# Patient Record
Sex: FEMALE | Race: WHITE | Employment: UNEMPLOYED | URBAN - METROPOLITAN AREA
[De-identification: names, ages, dates, MRNs, and addresses within clinical notes are randomized per-mention and may not be internally consistent; named-entity substitution may affect disease eponyms.]

---

## 2019-07-29 ENCOUNTER — HOSPITAL ENCOUNTER (EMERGENCY)
Age: 39
Discharge: HOME | End: 2019-07-30
Payer: COMMERCIAL

## 2019-07-29 DIAGNOSIS — K59.00 CONSTIPATION, UNSPECIFIED CONSTIPATION TYPE: ICD-10-CM

## 2019-07-29 DIAGNOSIS — K56.41 FECAL IMPACTION IN RECTUM (HCC): Primary | ICD-10-CM

## 2019-07-29 PROCEDURE — 99283 EMERGENCY DEPT VISIT LOW MDM: CPT

## 2019-07-29 RX ORDER — MONTELUKAST SODIUM 10 MG/1
10 TABLET ORAL
COMMUNITY

## 2019-07-29 RX ORDER — DIAZEPAM 2 MG/1
TABLET ORAL
COMMUNITY
Start: 2019-06-27

## 2019-07-29 ASSESSMENT — ENCOUNTER SYMPTOMS
NAUSEA: 0
CONSTIPATION: 1
ABDOMINAL PAIN: 0
CHEST TIGHTNESS: 0
VOMITING: 0
SHORTNESS OF BREATH: 0
DIARRHEA: 0
RECTAL PAIN: 1

## 2019-07-30 VITALS
WEIGHT: 170 LBS | DIASTOLIC BLOOD PRESSURE: 67 MMHG | HEART RATE: 113 BPM | OXYGEN SATURATION: 98 % | SYSTOLIC BLOOD PRESSURE: 107 MMHG | BODY MASS INDEX: 23.8 KG/M2 | HEIGHT: 71 IN | TEMPERATURE: 97.9 F | RESPIRATION RATE: 15 BRPM

## 2019-07-30 PROCEDURE — 6370000000 HC RX 637 (ALT 250 FOR IP): Performed by: NURSE PRACTITIONER

## 2019-07-30 RX ORDER — ALPRAZOLAM 0.5 MG/1
0.5 TABLET ORAL ONCE
Status: COMPLETED | OUTPATIENT
Start: 2019-07-30 | End: 2019-07-30

## 2019-07-30 RX ADMIN — ALPRAZOLAM 0.5 MG: 0.5 TABLET ORAL at 00:13

## 2019-07-30 RX ADMIN — Medication 240 ML: at 01:30

## 2019-10-17 DIAGNOSIS — M25.551 RIGHT HIP PAIN: Primary | ICD-10-CM

## 2019-11-13 ENCOUNTER — APPOINTMENT (OUTPATIENT)
Dept: PHYSICAL THERAPY | Age: 39
End: 2019-11-13
Payer: COMMERCIAL

## 2019-11-20 ENCOUNTER — HOSPITAL ENCOUNTER (OUTPATIENT)
Dept: PHYSICAL THERAPY | Age: 39
Setting detail: THERAPIES SERIES
Discharge: HOME | End: 2019-11-20
Payer: COMMERCIAL

## 2019-11-20 PROCEDURE — 97110 THERAPEUTIC EXERCISES: CPT

## 2019-11-20 PROCEDURE — 97161 PT EVAL LOW COMPLEX 20 MIN: CPT

## 2019-11-20 PROCEDURE — 97530 THERAPEUTIC ACTIVITIES: CPT

## 2019-11-20 PROCEDURE — 97140 MANUAL THERAPY 1/> REGIONS: CPT

## 2019-11-27 ENCOUNTER — APPOINTMENT (OUTPATIENT)
Dept: PHYSICAL THERAPY | Age: 39
End: 2019-11-27
Payer: COMMERCIAL

## 2019-12-02 ENCOUNTER — HOSPITAL ENCOUNTER (OUTPATIENT)
Dept: PHYSICAL THERAPY | Age: 39
Setting detail: THERAPIES SERIES
Discharge: HOME | End: 2019-12-02
Payer: COMMERCIAL

## 2019-12-02 PROCEDURE — 97140 MANUAL THERAPY 1/> REGIONS: CPT

## 2019-12-02 PROCEDURE — 97530 THERAPEUTIC ACTIVITIES: CPT

## 2019-12-02 PROCEDURE — 97110 THERAPEUTIC EXERCISES: CPT

## 2019-12-09 ENCOUNTER — HOSPITAL ENCOUNTER (OUTPATIENT)
Dept: PHYSICAL THERAPY | Age: 39
Setting detail: THERAPIES SERIES
Discharge: HOME | End: 2019-12-09
Payer: COMMERCIAL

## 2019-12-09 PROCEDURE — 97140 MANUAL THERAPY 1/> REGIONS: CPT

## 2019-12-09 PROCEDURE — 97110 THERAPEUTIC EXERCISES: CPT

## 2019-12-16 ENCOUNTER — HOSPITAL ENCOUNTER (OUTPATIENT)
Dept: PHYSICAL THERAPY | Age: 39
Setting detail: THERAPIES SERIES
Discharge: HOME | End: 2019-12-16
Payer: COMMERCIAL

## 2019-12-16 PROCEDURE — 97140 MANUAL THERAPY 1/> REGIONS: CPT

## 2019-12-16 PROCEDURE — 97110 THERAPEUTIC EXERCISES: CPT

## 2019-12-23 ENCOUNTER — HOSPITAL ENCOUNTER (OUTPATIENT)
Dept: PHYSICAL THERAPY | Age: 39
Setting detail: THERAPIES SERIES
Discharge: HOME | End: 2019-12-23
Payer: COMMERCIAL

## 2019-12-23 PROCEDURE — 97110 THERAPEUTIC EXERCISES: CPT

## 2019-12-23 PROCEDURE — 97140 MANUAL THERAPY 1/> REGIONS: CPT

## 2020-01-08 ENCOUNTER — HOSPITAL ENCOUNTER (OUTPATIENT)
Dept: PHYSICAL THERAPY | Age: 40
Setting detail: THERAPIES SERIES
Discharge: HOME | End: 2020-01-08
Payer: COMMERCIAL

## 2020-01-08 PROCEDURE — 97140 MANUAL THERAPY 1/> REGIONS: CPT

## 2020-01-08 PROCEDURE — 97110 THERAPEUTIC EXERCISES: CPT

## 2020-01-08 NOTE — FLOWSHEET NOTE
[employer]    Physical Therapy Treatment Note/ Progress Report:     Date:  2020    Patient Name:  Lorenza Rodriguez    :  1980  MRN: [id number]  Restrictions/Precautions:    Medical/Treatment Diagnosis Information:  · Diagnosis: Acute right-sided low back pain with sciatica (M54.40)  · Treatment Diagnosis: Low back pain   Insurance/Certification information:  PT Insurance Information: [facility] [employer] - $2000 deductible (met), $6500 OOP max (met), $0 co-pay, 100% co-insurance, 20 PT visits used per calendar year (9 previously used)   Physician Information:  Referring Practitioner: Dr. Machelle Hunter of care signed (Y/N):     Date of Patient follow up with Physician:      Progress Report: []  Yes  [x]  No     Date Range for reporting period:  Beginnin19  Ending:      Progress report due (10 Rx/or 30 days whichever is less): 63    Recertification due (POC duration/ or 90 days whichever is less): 1/15/20     Visit # Insurance Allowable Auth Needed   6 total  1 in 2020 20 []Yes    [x]No     Pain level:  4/10     SUBJECTIVE:  Pt states that she was busy on her feet more with the holidays and then was sick and did not do very much for the last week. Pt states that the lateral hip and posterior thigh have been sore as a result. Pt will be traveling to [place] this weekend for a wedding and then eager to get back into more consistent PT when she returns.       OBJECTIVE: See eval   Observation:    Test measurements:      RESTRICTIONS/PRECAUTIONS: S/p L hip open surgical dislocation, trochanteric osteotomy, microfracture, debridement of femoral head 19; EDS; B hip OA (pt to have L TOSHA in near future)     Exercises/Interventions:     Therapeutic Ex 25' Wt / Resistance sets/sec reps notes   Hip Ext       Bridge 2-1       Kneeling Alt Arm-Leg       Side lying LB rot       Front Plank       Side planks        Kneeling hip abd/ext       1/2 kneeling down chop       Std band pull down       TKE red 2 10    Glute set  5\" 10    Hip add BS  5\" 10    Hip abd iso's in hooklying  5\" 10    S/L clamshells  1 10 L only  Decreased ROM   Pelvic tilts  1 10    Supine marches w/TA act  1 10    Supine BKFO w/TA act No resistance 1 10    SB rolls for knee/hip flexion     SAQ     Seated HS stretch w/foot on stool  30\" 3 manual   Standing incline calf stretch  30\" 3 2nd level          Manual Intervention 30'              Prone PA       GISTM/STM 15'   Distal quad, ITB, lateral hamstrings   L hip gentle PROM 5'      SI Manip       Hip belt mobs 10'      Hip LA distraction              NMR re-education        Mf Activation- re-ed       TrA Re-ed activation       Glute Max re-ed activation                       Therapeutic Exercise and NMR EXR  [x] (62082) Provided verbal/tactile cueing for activities related to strengthening, flexibility, endurance, ROM  for improvements in proximal hip and core control with self care, mobility, lifting and ambulation.  [] (03580) Provided verbal/tactile cueing for activities related to improving balance, coordination, kinesthetic sense, posture, motor skill, proprioception  to assist with core control in self care, mobility, lifting, and ambulation. Therapeutic Activities:  13' Discussed with pt importance of appropriate exercise and WB'ing progression. Discussed with pt importance of core activation with ADL's and of hip strength in order to return to walking.     [x] (99535 or 88629) Provided verbal/tactile cueing for activities related to improving balance, coordination, kinesthetic sense, posture, motor skill, proprioception and motor activation to allow for proper function  with self care and ADLs  [] (89441) Provided training and instruction to the patient for proper core and proximal hip recruitment and positioning with ambulation re-education     Home Exercise Program:    [x] (17558) Reviewed/Progressed HEP activities related to strengthening, flexibility, endurance, ROM of core, proximal hip and LE for functional self-care, mobility, lifting and ambulation   [] (23709) Reviewed/Progressed HEP activities related to improving balance, coordination, kinesthetic sense, posture, motor skill, proprioception of core, proximal hip and LE for self care, mobility, lifting, and ambulation      Manual Treatments:  PROM / STM / Oscillations-Mobs:  G-I, II, III, IV (PA's, Inf., Post.)  [x] (05010) Provided manual therapy to mobilize proximal hip and LS spine soft tissue/joints for the purpose of modulating pain, promoting relaxation,  increasing ROM, reducing/eliminating soft tissue swelling/inflammation/restriction, improving soft tissue extensibility and allowing for proper ROM for normal function with self care, mobility, lifting and ambulation. Modalities:  Declined      Charges:  Timed Code Treatment Minutes: 55   Total Treatment Minutes: 55       [] EVAL (LOW) 36349 (typically 20 minutes face-to-face)  [] EVAL (MOD) 19957 (typically 30 minutes face-to-face)  [] EVAL (HIGH) 94793 (typically 45 minutes face-to-face)  [] RE-EVAL     [x] VQ(78585) x 2    [] IONTO (91497)  [] NMR (67832) x     [] VASO (64970)  [x] Manual (45584) x2     [] Other:  [] TA (16617)x     [] Mech Traction (49887)  [] ES(attended) (03275)     [] ES (un) (13719):     Goals:   Patient stated goal: \"To get off of crutches and to delay total hip replacement\"   []? Progressing: []? Met: []? Not Met: []? Adjusted     Therapist goals for Patient:   Short Term Goals: To be achieved in: 2 weeks  1. Independent in HEP and progression per patient tolerance, in order to prevent re-injury. []? Progressing: []? Met: []? Not Met: []? Adjusted  2. Patient will have a decrease in pain to facilitate improvement in movement, function, and ADLs as indicated by Functional Deficits. []? Progressing: []? Met: []? Not Met: []? Adjusted     Long Term Goals:  To be well [x] Patient limited by fatique  [] Patient limited by pain  [] Patient limited by other medical complications  [] Other:     Overall Progression Towards Functional goals/ Treatment Progress Update:  [] Patient is progressing as expected towards functional goals listed. [] Progression is slowed due to complexities/Impairments listed. [] Progression has been slowed due to co-morbidities. [x] Plan just implemented, too soon to assess goals progression <30days   [] Goals require adjustment due to lack of progress  [] Patient is not progressing as expected and requires additional follow up with physician  [] Other:    Prognosis for POC: [] Good [x] Fair  [] Poor    Patient requires continued skilled intervention: [x] Yes  [] No        PLAN: Progress hip ROM/strength and normalize gait pattern as tolerated by pt. [x] Continue per plan of care [] Alter current plan (see comments)  [] Plan of care initiated [] Hold pending MD visit [] Discharge    Electronically signed by: Ayleen Lima PT    Note: If patient does not return for scheduled/recommended follow up visits, this note will serve as a discharge from care along with the most recent update on progress.

## 2020-01-13 ENCOUNTER — HOSPITAL ENCOUNTER (OUTPATIENT)
Dept: PHYSICAL THERAPY | Age: 40
Setting detail: THERAPIES SERIES
Discharge: HOME | End: 2020-01-13
Payer: COMMERCIAL

## 2020-01-13 PROCEDURE — 97140 MANUAL THERAPY 1/> REGIONS: CPT

## 2020-01-13 PROCEDURE — 97110 THERAPEUTIC EXERCISES: CPT

## 2020-01-13 NOTE — FLOWSHEET NOTE
band for HEP   Glute set     Hip add BS  5\" 10    Hip abd iso's in hooklying  5\" 10    S/L clamshells  1 10 L only  Decreased ROM   Pelvic tilts  1 10    Supine marches w/TA act  1 10    Supine BKFO w/TA act No resistance 1 10    SB rolls for knee/hip flexion     SAQ     Seated HS stretch w/foot on stool  30\" 3 manual   Standing incline calf stretch  30\" 3 2nd level          Manual Intervention 25'              Prone PA       GISTM/STM 10'   Distal quad, ITB, lateral hamstrings   L hip gentle PROM 5'      SI Manip       Hip belt mobs 10'      Hip LA distraction              NMR re-education        Mf Activation- re-ed       TrA Re-ed activation       Glute Max re-ed activation                       Therapeutic Exercise and NMR EXR  [x] (83784) Provided verbal/tactile cueing for activities related to strengthening, flexibility, endurance, ROM  for improvements in proximal hip and core control with self care, mobility, lifting and ambulation.  [] (43334) Provided verbal/tactile cueing for activities related to improving balance, coordination, kinesthetic sense, posture, motor skill, proprioception  to assist with core control in self care, mobility, lifting, and ambulation. Therapeutic Activities:  13' Discussed with pt importance of appropriate exercise and WB'ing progression. Discussed with pt importance of core activation with ADL's and of hip strength in order to return to walking.     [x] (33340 or 74607) Provided verbal/tactile cueing for activities related to improving balance, coordination, kinesthetic sense, posture, motor skill, proprioception and motor activation to allow for proper function  with self care and ADLs  [] (45898) Provided training and instruction to the patient for proper core and proximal hip recruitment and positioning with ambulation re-education     Home Exercise Program:    [x] (99057) Reviewed/Progressed HEP activities related to strengthening, flexibility, endurance, ROM of core, proximal hip and LE for functional self-care, mobility, lifting and ambulation   [] (60473) Reviewed/Progressed HEP activities related to improving balance, coordination, kinesthetic sense, posture, motor skill, proprioception of core, proximal hip and LE for self care, mobility, lifting, and ambulation      Manual Treatments:  PROM / STM / Oscillations-Mobs:  G-I, II, III, IV (PA's, Inf., Post.)  [x] (97249) Provided manual therapy to mobilize proximal hip and LS spine soft tissue/joints for the purpose of modulating pain, promoting relaxation,  increasing ROM, reducing/eliminating soft tissue swelling/inflammation/restriction, improving soft tissue extensibility and allowing for proper ROM for normal function with self care, mobility, lifting and ambulation. Modalities:  Declined      Charges:  Timed Code Treatment Minutes: 55   Total Treatment Minutes: 55       [] EVAL (LOW) 99643 (typically 20 minutes face-to-face)  [] EVAL (MOD) 32373 (typically 30 minutes face-to-face)  [] EVAL (HIGH) 82405 (typically 45 minutes face-to-face)  [] RE-EVAL     [x] BH(73204) x 2    [] IONTO (20258)  [] NMR (42858) x     [] VASO (77396)  [x] Manual (69847) x2     [] Other:  [] TA (51470)x     [] Mech Traction (18074)  [] ES(attended) (39950)     [] ES (un) (10981):     Goals:   Patient stated goal: \"To get off of crutches and to delay total hip replacement\"   []? Progressing: []? Met: []? Not Met: []? Adjusted     Therapist goals for Patient:   Short Term Goals: To be achieved in: 2 weeks  1. Independent in HEP and progression per patient tolerance, in order to prevent re-injury. []? Progressing: []? Met: []? Not Met: []? Adjusted  2. Patient will have a decrease in pain to facilitate improvement in movement, function, and ADLs as indicated by Functional Deficits. []? Progressing: []? Met: []? Not Met: []? Adjusted     Long Term Goals: To be achieved in: 6-8 weeks  1.  Disability index score of 50% or less for the BRAVO to medical complications  [] Other:     Overall Progression Towards Functional goals/ Treatment Progress Update:  [] Patient is progressing as expected towards functional goals listed. [] Progression is slowed due to complexities/Impairments listed. [] Progression has been slowed due to co-morbidities. [x] Plan just implemented, too soon to assess goals progression <30days   [] Goals require adjustment due to lack of progress  [] Patient is not progressing as expected and requires additional follow up with physician  [] Other:    Prognosis for POC: [] Good [x] Fair  [] Poor    Patient requires continued skilled intervention: [x] Yes  [] No        PLAN: Progress hip ROM/strength and normalize gait pattern as tolerated by pt. Pt plans to go to pool this weekend and do some gentle walking in pool for LE strengthening. [x] Continue per plan of care [] Alter current plan (see comments)  [] Plan of care initiated [] Hold pending MD visit [] Discharge    Electronically signed by: Jovana Johnston PT    Note: If patient does not return for scheduled/recommended follow up visits, this note will serve as a discharge from care along with the most recent update on progress.

## 2020-01-22 ENCOUNTER — APPOINTMENT (OUTPATIENT)
Dept: PHYSICAL THERAPY | Age: 40
End: 2020-01-22
Payer: COMMERCIAL

## 2020-01-29 ENCOUNTER — HOSPITAL ENCOUNTER (OUTPATIENT)
Dept: PHYSICAL THERAPY | Age: 40
Setting detail: THERAPIES SERIES
Discharge: HOME | End: 2020-01-29
Payer: COMMERCIAL

## 2020-01-29 PROCEDURE — 97140 MANUAL THERAPY 1/> REGIONS: CPT

## 2020-01-29 PROCEDURE — 97110 THERAPEUTIC EXERCISES: CPT

## 2020-01-29 NOTE — PROGRESS NOTES
[] (40794) Reviewed/Progressed HEP activities related to improving balance, coordination, kinesthetic sense, posture, motor skill, proprioception of core, proximal hip and LE for self care, mobility, lifting, and ambulation      Manual Treatments:  PROM / STM / Oscillations-Mobs:  G-I, II, III, IV (PA's, Inf., Post.)  [x] (35983) Provided manual therapy to mobilize proximal hip and LS spine soft tissue/joints for the purpose of modulating pain, promoting relaxation,  increasing ROM, reducing/eliminating soft tissue swelling/inflammation/restriction, improving soft tissue extensibility and allowing for proper ROM for normal function with self care, mobility, lifting and ambulation. Modalities:  Declined      Charges:  Timed Code Treatment Minutes: 55   Total Treatment Minutes: 55       [] EVAL (LOW) 80426 (typically 20 minutes face-to-face)  [] EVAL (MOD) 73771 (typically 30 minutes face-to-face)  [] EVAL (HIGH) 58706 (typically 45 minutes face-to-face)  [] RE-EVAL     [x] PQ(12251) x 2    [] IONTO (79305)  [] NMR (85222) x     [] VASO (91137)  [x] Manual (51919) x2     [] Other:  [] TA (68419)x     [] Mech Traction (63113)  [] ES(attended) (81329)     [] ES (un) (71704):     Goals:   Patient stated goal: \"To get off of crutches and to delay total hip replacement\"   []? Progressing: []? Met: []? Not Met: []? Adjusted     Therapist goals for Patient:   Short Term Goals: To be achieved in: 2 weeks  1. Independent in HEP and progression per patient tolerance, in order to prevent re-injury. []? Progressing: [x]? Met: []? Not Met: []? Adjusted  2. Patient will have a decrease in pain to facilitate improvement in movement, function, and ADLs as indicated by Functional Deficits. [x]? Progressing: []? Met: []? Not Met: []? Adjusted     Long Term Goals: To be achieved in: 6-8 weeks  1. Disability index score of 50% or less for the BRAVO to assist with reaching prior level of function. [x]? Progressing: []?  Met: []? Not Met: []? Adjusted  2. Patient will demonstrate increased AROM to WNL, good LS mobility, good hip ROM to allow for proper joint functioning as indicated by patients Functional Deficits. [x]? Progressing: []? Met: []? Not Met: []? Adjusted  3. Patient will demonstrate an increase in Strength to good proximal hip and core activation to allow for proper functional mobility as indicated by patients Functional Deficits. [x]? Progressing: []? Met: []? Not Met: []? Adjusted  4. Patient will return to walking for 20 minutes without increased symptoms or restriction. [x]? Progressing: []? Met: []? Not Met: []? Adjusted  5. Patient will be able to drive for 20 minutes without increased symptoms or restriction. []? Progressing: []? Met: [x]? Not Met: []? Adjusted      Progression Towards Functional goals:  [] Patient is progressing as expected towards functional goals listed. [] Progression is slowed due to complexities listed. [] Progression has been slowed due to co-morbidities. [x] Plan just implemented, too soon to assess goals progression  [] Other:     ASSESSMENT:  Pt demonstrates increased TTP and tightness along distal vastus lateralis, distal lateral hamstrings and distal ITB today. Added tandem stance, supine clamshells and lateral stepping at table for improved gluteus medius activation. Pt requires cues for increased glut activation with hip extension during ambulation with 1 crutch, and demonstrated improved gait mechanics after verbal cues and visual cues with mirror. Pt continues to require skilled PT services to address hip/low back flexibility and core/hip strength in order to decrease pain and improve function, in order to care for her children and walk without crutches.        Treatment/Activity Tolerance:  [] Patient tolerated treatment well [x] Patient limited by fatique  [] Patient limited by pain  [] Patient limited by other medical complications  [] Other:     Overall Progression Towards

## 2020-02-03 ENCOUNTER — HOSPITAL ENCOUNTER (OUTPATIENT)
Dept: PHYSICAL THERAPY | Age: 40
Setting detail: THERAPIES SERIES
Discharge: HOME | End: 2020-02-03
Payer: COMMERCIAL

## 2020-02-03 PROCEDURE — 97110 THERAPEUTIC EXERCISES: CPT

## 2020-02-03 PROCEDURE — 97140 MANUAL THERAPY 1/> REGIONS: CPT

## 2020-02-03 NOTE — PROGRESS NOTES
[employer]    Physical Therapy Treatment Note/ Progress Report:     Date:  2/3/2020    Patient Name:  Lorenza Rodriguez    :  1980  MRN: [id number]  Restrictions/Precautions:    Medical/Treatment Diagnosis Information:  · Diagnosis: Acute right-sided low back pain with sciatica (M54.40)  · Treatment Diagnosis: Low back pain   Insurance/Certification information:  PT Insurance Information: [facility] [employer] - $2000 deductible (met), $6500 OOP max (met), $0 co-pay, 100% co-insurance, 20 PT visits used per calendar year (9 previously used)   Physician Information:  Referring Practitioner: Dr. Machelle Hunter of care signed (Y/N):     Date of Patient follow up with Physician:      Progress Report: [x]  Yes  []  No     Date Range for reporting period:  Beginnin20  Ending:      Progress report due (10 Rx/or 30 days whichever is less):     Recertification due (POC duration/ or 90 days whichever is less): 20     Visit # Insurance Allowable Auth Needed   9 total  4 in 2020 []Yes    [x]No     Pain level:  4/10     SUBJECTIVE:  Pt states that she had some lateral hip muscle soreness after last visit. Pt went to pool the following day and walked in the pool, reports feeling great afterwards. Pt states that she was also able to go to an event this past weekend where she only used 1 crutch for ambulation and felt pretty good overall and did not feel like she was limping. Overall, pt states that her back is feeling better since starting PT. Pt follows up with Dr. Annetta Bucio on Wednesday.          OBJECTIVE: See eval   Observation:    Test measurements:   20:   Lumbar ROM: flex = 40, ext = 8, SB = 50% B, rot = 50% B   L MMT: hip flex = 3+, knee ext = 4-, knee flex = 3+, ankle DF = 4-    BRAVO = 58% disability   LEFS = 67% disability     RESTRICTIONS/PRECAUTIONS: S/p L hip open surgical dislocation, trochanteric osteotomy, microfracture, debridement of femoral head 6/26/19; EDS; B hip OA (pt to have L TOSHA in near future)     Exercises/Interventions:     Therapeutic Ex 30' Wt / Resistance sets/sec reps notes   Hip Ext       Bridge 2-1       Kneeling Alt Arm-Leg       Side lying LB rot       Front Plank       Side planks        Lateral stepping next to table  1 2 laps  (3-4 steps per lap) Small steps    Standing glut med activation  1 10 L only    Standing HR  1 10    TKE  2 10 Ball on wall due to pt having difficulty using band for HEP   Supine clamshells orange 2 10    Hip add BS     Hip abd iso's in hooklying  5\" 10    S/L clamshells  1 10 L only  Decreased ROM   Pelvic tilts  HEP   Supine marches w/TA act  1 10    Supine BKFO w/TA act No resistance 1 10    SB rolls for knee/hip flexion     LAQ  210 B   Seated HS stretch w/foot on stool  30\" 3 manual   Standing incline calf stretch  30\" 3 2nd level   Standing marches  1 15 Toe touch with weight shift to L LE          Manual Intervention 30'              Prone PA       GISTM/STM 10'   Distal quad, ITB, lateral hamstrings   L hip gentle PROM 5'      ITB mobs 5'      Hip belt mobs 10'      Hip LA distraction              NMR re-education        Mf Activation- re-ed       TrA Re-ed activation       Glute Max re-ed activation       Tandem stance  20\" 3    Gait training  5'  1 crutch  w/mirror     Therapeutic Exercise and NMR EXR  [x] (44236) Provided verbal/tactile cueing for activities related to strengthening, flexibility, endurance, ROM  for improvements in proximal hip and core control with self care, mobility, lifting and ambulation.  [] (56366) Provided verbal/tactile cueing for activities related to improving balance, coordination, kinesthetic sense, posture, motor skill, proprioception  to assist with core control in self care, mobility, lifting, and ambulation. Therapeutic Activities:  13' Discussed with pt importance of appropriate exercise and WB'ing progression.  Discussed with pt importance of core activation with ADL's and of hip strength in order to return to walking. [x] (01818 or 02552) Provided verbal/tactile cueing for activities related to improving balance, coordination, kinesthetic sense, posture, motor skill, proprioception and motor activation to allow for proper function  with self care and ADLs  [] (53005) Provided training and instruction to the patient for proper core and proximal hip recruitment and positioning with ambulation re-education     Home Exercise Program:    [x] (93304) Reviewed/Progressed HEP activities related to strengthening, flexibility, endurance, ROM of core, proximal hip and LE for functional self-care, mobility, lifting and ambulation   [] (80303) Reviewed/Progressed HEP activities related to improving balance, coordination, kinesthetic sense, posture, motor skill, proprioception of core, proximal hip and LE for self care, mobility, lifting, and ambulation      Manual Treatments:  PROM / STM / Oscillations-Mobs:  G-I, II, III, IV (PA's, Inf., Post.)  [x] (94247) Provided manual therapy to mobilize proximal hip and LS spine soft tissue/joints for the purpose of modulating pain, promoting relaxation,  increasing ROM, reducing/eliminating soft tissue swelling/inflammation/restriction, improving soft tissue extensibility and allowing for proper ROM for normal function with self care, mobility, lifting and ambulation.      Modalities:  Declined      Charges:  Timed Code Treatment Minutes: 60   Total Treatment Minutes: 65       [] EVAL (LOW) 33980 (typically 20 minutes face-to-face)  [] EVAL (MOD) 67644 (typically 30 minutes face-to-face)  [] EVAL (HIGH) 43250 (typically 45 minutes face-to-face)  [] RE-EVAL     [x] BN(34471) x 2    [] IONTO (98015)  [] NMR (54719) x     [] VASO (14067)  [x] Manual (19421) x2     [] Other:  [] TA (65491)x     [] Mech Traction (48986)  [] ES(attended) (16272)     [] ES (un) (68113):     Goals:   Patient stated goal: \"To get off of crutches and to delay total hip replacement\"   []? Progressing: []? Met: []? Not Met: []? Adjusted     Therapist goals for Patient:   Short Term Goals: To be achieved in: 2 weeks  1. Independent in HEP and progression per patient tolerance, in order to prevent re-injury. []? Progressing: [x]? Met: []? Not Met: []? Adjusted  2. Patient will have a decrease in pain to facilitate improvement in movement, function, and ADLs as indicated by Functional Deficits. [x]? Progressing: []? Met: []? Not Met: []? Adjusted     Long Term Goals: To be achieved in: 6-8 weeks  1. Disability index score of 50% or less for the BRAVO to assist with reaching prior level of function. [x]? Progressing: []? Met: []? Not Met: []? Adjusted  2. Patient will demonstrate increased AROM to WNL, good LS mobility, good hip ROM to allow for proper joint functioning as indicated by patients Functional Deficits. [x]? Progressing: []? Met: []? Not Met: []? Adjusted  3. Patient will demonstrate an increase in Strength to good proximal hip and core activation to allow for proper functional mobility as indicated by patients Functional Deficits. [x]? Progressing: []? Met: []? Not Met: []? Adjusted  4. Patient will return to walking for 20 minutes without increased symptoms or restriction. [x]? Progressing: []? Met: []? Not Met: []? Adjusted  5. Patient will be able to drive for 20 minutes without increased symptoms or restriction. []? Progressing: []? Met: [x]? Not Met: []? Adjusted      Progression Towards Functional goals:  [] Patient is progressing as expected towards functional goals listed. [] Progression is slowed due to complexities listed. [] Progression has been slowed due to co-morbidities.   [x] Plan just implemented, too soon to assess goals progression  [] Other:     ASSESSMENT: Pt demonstrates decreased TTP along vastus lateralis, but continues to demonstrate continued TTP along middle to distal ITB and distal lateral hamstrings. Added standing marches for improved gluteus medius activation with weight shift, and added LAQ for increased quad strength. Pt was fatigued with exercise progressions, but no increased hip or LBP noted. Treatment/Activity Tolerance:  [] Patient tolerated treatment well [x] Patient limited by fatique  [] Patient limited by pain  [] Patient limited by other medical complications  [] Other:     Overall Progression Towards Functional goals/ Treatment Progress Update:  [] Patient is progressing as expected towards functional goals listed. [x] Progression is slowed due to complexities/Impairments listed. [] Progression has been slowed due to co-morbidities. [] Plan just implemented, too soon to assess goals progression <30days   [] Goals require adjustment due to lack of progress  [] Patient is not progressing as expected and requires additional follow up with physician  [] Other:    Prognosis for POC: [] Good [x] Fair  [] Poor    Patient requires continued skilled intervention: [x] Yes  [] No        PLAN: Progress hip ROM/strength and normalize gait pattern as tolerated by pt. Pt follows up with Dr. Corey Sep 2/5/20 to discuss his opinion on scheduling L hip sx vs. Holding off on sx. [x] Continue per plan of care [] Alter current plan (see comments)  [] Plan of care initiated [] Hold pending MD visit [] Discharge    Electronically signed by: Rambo Moncada PT    Note: If patient does not return for scheduled/recommended follow up visits, this note will serve as a discharge from care along with the most recent update on progress.

## 2020-02-10 ENCOUNTER — HOSPITAL ENCOUNTER (OUTPATIENT)
Dept: PHYSICAL THERAPY | Age: 40
Setting detail: THERAPIES SERIES
Discharge: HOME | End: 2020-02-10
Payer: COMMERCIAL

## 2020-02-10 PROCEDURE — 97530 THERAPEUTIC ACTIVITIES: CPT

## 2020-02-10 PROCEDURE — 97110 THERAPEUTIC EXERCISES: CPT

## 2020-02-10 PROCEDURE — 97140 MANUAL THERAPY 1/> REGIONS: CPT

## 2020-02-10 NOTE — PROGRESS NOTES
[facility]    Physical Therapy Treatment Note/ Progress Report:     Date:  2/10/2020    Patient Name:  Kartik Montiel    :  1980  MRN: [id number]  Restrictions/Precautions:    Medical/Treatment Diagnosis Information:  · Diagnosis: Acute right-sided low back pain with sciatica (M54.40)  · Treatment Diagnosis: Low back pain   Insurance/Certification information:  PT Insurance Information: [facility] [employer] - $2000 deductible (met), $6500 OOP max (met), $0 co-pay, 100% co-insurance, 20 PT visits used per calendar year (9 previously used)   Physician Information:  Referring Practitioner: Dr. Cuca Wilson of care signed (Y/N):     Date of Patient follow up with Physician:      Progress Report: [x]  Yes  []  No     Date Range for reporting period:  Beginnin20  Ending:      Progress report due (10 Rx/or 30 days whichever is less): 3/14/48    Recertification due (POC duration/ or 90 days whichever is less): 20     Visit # Insurance Allowable Auth Needed   10 total  5 in 2020 []Yes    [x]No     Pain level:  4/10     SUBJECTIVE:  Pt reports that her hip has been feeling stronger overall, and feels like she is able to do more. Pt states that she went to pool again this past weekend and hip felt really good with walking around. Pt reports that she saw Dr. Veronica Carrillo, who discussed with pt about having TOSHA at the end of the summer. Pt states that overall, her low back is feeling much better.          OBJECTIVE: See eval   Observation:    Test measurements:   20:   Lumbar ROM: flex = 40, ext = 8, SB = 50% B, rot = 50% B   L MMT: hip flex = 3+, knee ext = 4-, knee flex = 3+, ankle DF = 4-    BRAVO = 58% disability   LEFS = 67% disability     RESTRICTIONS/PRECAUTIONS: S/p L hip open surgical dislocation, trochanteric osteotomy, microfracture, debridement of femoral head 19; EDS; B hip OA (pt to have L TOSHA in near future) Exercises/Interventions:     Therapeutic Ex 30' Wt / Resistance sets/sec reps notes   Hip Ext       Bridge 2-1       Kneeling Alt Arm-Leg       Side lying LB rot       Front Plank       Side planks        Lateral stepping next to table  1 2 laps  (3-4 steps per lap) Small steps    Standing glut med activation  1 10 L only    Standing HR  1 10    TKE  2 10 Ball on wall due to pt having difficulty using band for HEP   Supine clamshells orange 2 10    Bridges  2 10    S/L clamshells  1 10 L only  Decreased ROM   Pelvic tilts  HEP   Supine marches w/TA act  1 10    Supine BKFO w/TA act No resistance 1 10    SB rolls for knee/hip flexion     LAQ  210 B   Seated HS stretch w/foot on stool  30\" 3 manual   Standing incline calf stretch  30\" 3 2nd level   Standing gentle hip flexor stretch  30\" 3    Standing marches  1 15 Toe touch with weight shift to L LE          Manual Intervention 30'              Prone PA       GISTM/STM 10'   Distal quad, ITB, lateral hamstrings   L hip gentle PROM 5'      ITB mobs 5'      Hip belt mobs 10'      Hip LA distraction              NMR re-education        Mf Activation- re-ed       TrA Re-ed activation       Glute Max re-ed activation       Tandem stance  20\" 3    Gait training  1 crutch  w/mirror     Therapeutic Exercise and NMR EXR  [x] (33716) Provided verbal/tactile cueing for activities related to strengthening, flexibility, endurance, ROM  for improvements in proximal hip and core control with self care, mobility, lifting and ambulation.  [] (66621) Provided verbal/tactile cueing for activities related to improving balance, coordination, kinesthetic sense, posture, motor skill, proprioception  to assist with core control in self care, mobility, lifting, and ambulation. Therapeutic Activities:  13' Discussed with pt importance of appropriate exercise and WB'ing progression.  Discussed with pt importance of core activation with ADL's and of hip strength in order to return to walking. [x] (14118 or 12400) Provided verbal/tactile cueing for activities related to improving balance, coordination, kinesthetic sense, posture, motor skill, proprioception and motor activation to allow for proper function  with self care and ADLs  [] (91591) Provided training and instruction to the patient for proper core and proximal hip recruitment and positioning with ambulation re-education     Home Exercise Program:    [x] (76136) Reviewed/Progressed HEP activities related to strengthening, flexibility, endurance, ROM of core, proximal hip and LE for functional self-care, mobility, lifting and ambulation   [] (09547) Reviewed/Progressed HEP activities related to improving balance, coordination, kinesthetic sense, posture, motor skill, proprioception of core, proximal hip and LE for self care, mobility, lifting, and ambulation      Manual Treatments:  PROM / STM / Oscillations-Mobs:  G-I, II, III, IV (PA's, Inf., Post.)  [x] (62367) Provided manual therapy to mobilize proximal hip and LS spine soft tissue/joints for the purpose of modulating pain, promoting relaxation,  increasing ROM, reducing/eliminating soft tissue swelling/inflammation/restriction, improving soft tissue extensibility and allowing for proper ROM for normal function with self care, mobility, lifting and ambulation. Modalities:  Declined      Charges:  Timed Code Treatment Minutes: 55   Total Treatment Minutes: 60       [] EVAL (LOW) 20811 (typically 20 minutes face-to-face)  [] EVAL (MOD) 20781 (typically 30 minutes face-to-face)  [] EVAL (HIGH) 28834 (typically 45 minutes face-to-face)  [] RE-EVAL     [x] SD(62416) x 1    [] IONTO (73200)  [] NMR (82286) x     [] VASO (73878)  [x] Manual (70812) x2     [] Other:  [x] TA (18609)x1     [] Mech Traction (08986)  [] ES(attended) (29326)     [] ES (un) (48931):     Goals:   Patient stated goal: \"To get off of crutches and to delay total hip replacement\"   []? Progressing: []?  Met: []?

## 2020-02-19 ENCOUNTER — APPOINTMENT (OUTPATIENT)
Dept: PHYSICAL THERAPY | Age: 40
End: 2020-02-19
Payer: COMMERCIAL

## 2020-02-24 ENCOUNTER — APPOINTMENT (OUTPATIENT)
Dept: PHYSICAL THERAPY | Age: 40
End: 2020-02-24
Payer: COMMERCIAL

## 2020-07-13 ENCOUNTER — HOSPITAL ENCOUNTER (OUTPATIENT)
Age: 40
Discharge: HOME | End: 2020-07-13
Payer: COMMERCIAL

## 2020-07-13 ENCOUNTER — OFFICE VISIT (OUTPATIENT)
Dept: ORTHOPEDIC SURGERY | Age: 40
End: 2020-07-13
Payer: COMMERCIAL

## 2020-07-13 VITALS — HEIGHT: 71 IN | WEIGHT: 160 LBS | BODY MASS INDEX: 22.4 KG/M2

## 2020-07-13 PROCEDURE — 36415 COLL VENOUS BLD VENIPUNCTURE: CPT

## 2020-07-13 PROCEDURE — 85652 RBC SED RATE AUTOMATED: CPT

## 2020-07-13 PROCEDURE — 99243 OFF/OP CNSLTJ NEW/EST LOW 30: CPT | Performed by: ORTHOPAEDIC SURGERY

## 2020-07-13 PROCEDURE — 85025 COMPLETE CBC W/AUTO DIFF WBC: CPT

## 2020-07-13 PROCEDURE — 86140 C-REACTIVE PROTEIN: CPT

## 2020-07-13 NOTE — PROGRESS NOTES
Chief Complaint    Hip Pain (lt hip ongoing pain , had sx last year that didn't take, looking for a specific type of THR)      History of Present Illness:  Sage Caban is a 47 y.o. female who was referred by her primary care physician Dr. Sienna Godinez. Patient presents today for new patient evaluation of left hip pain. Patient has a history of planned femoral osteotomy in June 2019 performed by Dr. Rajesh Godoy. Patient states that the femoral osteotomy surgery was halted intraoperatively because Dr. Rajesh Godoy felt that there was too much osteoarthritis noted within the left hip and the femoral osteotomy would not give her the relief that she needs. Patient had an intra-articular hip injection prior to the osteotomy which gave her almost complete relief. She states that since the failed femoral osteotomy in 2019 she has had continued pain and during her rehab in September 2019 she tore her gluteus which created bleeding and hematoma. This also created weakness and imbalance. She states that she continues to have significant lateral hip pain with only mild groin pain. She does experience a catching sensation with external rotation within the left hip. Is presently ambulating without an assistive device with a limp favoring the left hip. She has been seen by numerous orthopedic surgeons and total hip arthroplasty was recommended. Patient has a history of Sb-Danlos syndrome, IBS, headaches, and anxiety. She is been in pain management postoperatively secondary to severe pain post femoral osteotomy. Patient does have a questionable allergy to nickel and has been formally tested. She also is latex allergic. She has seen multiple orthopedic surgeons including Dr. Eve Honeycutt, Dr. Alvina Marti in [place], Dr. Carlos Canela, Dr. Marquez Pearson, as well as at least 3 other surgeons. She basically is coming in today to schedule a total hip replacement.       Medical History:  Patient's medications, allergies, past medical, surgical, social and family histories were reviewed and updated as appropriate. Review of Systems:  Relevant review of systems reviewed and available in the patient's chart    Vital Signs: There were no vitals filed for this visit. General Exam:   Constitutional: Patient is adequately groomed with no evidence of malnutrition  DTRs: Deep tendon reflexes are intact  Mental Status: The patient is oriented to time, place and person. The patient's mood and affect are appropriate. Lymphatic: The lymphatic examination bilaterally reveals all areas to be without enlargement or induration. Vascular: Examination reveals no swelling or calf tenderness. Peripheral pulses are palpable and 2+. Neurological: The patient has good coordination. There is no weakness or sensory deficit. Body mass index is 22.32 kg/m². Left hip Examination:    Inspection:  No erythema or signs of infection. There are no cutaneous lesions. There is a well-healed lateral incision noted over the left hip  Palpation: Left hip tenderness to palpation over the greater trochanteric region and along her incision. There is only mild groin pain. Range of Motion:  Painful limited range of motion of the hip particularly with flexion and external rotation causing reproducible groin pain. Strength: -5/5 strength in flexion and abduction limited by pain. Special Tests: There is a positive log roll maneuver. Positive straight leg raise against resistance. Positive Rudy's test.  Negative Homans test.    Skin: There are no rashes, ulcerations or lesions. Gait: Antalgic favoring the left side           Additional Examinations:         Contralateral Exam: Examination of the right hip reveals intact skin. The patient demonstrates full painless range of motion with regards to flexion, abduction, internal and external rotation. There is no tenderness about the greater trochanter.  There is a negative straight leg raise against resistance. Strength is 5/5 throughout all planes. Lower Back: Examination of the lower back does not show any tenderness, deformity or injury. Range of motion is unremarkable. There is no gross instability. There are no rashes, ulcerations or lesions. Strength and tone are normal.    Radiology:     X-rays obtained and reviewed in office:  Views 2 views including AP pelvis and lateral  Location left hip  Impression no fractures or malalignment identified. There is mild degenerative osteoarthritis of the femoral acetabular joint space narrowing. Impression:  Encounter Diagnoses   Name Primary?  Pain of left hip joint Yes    Primary osteoarthritis of right hip        Office Procedures:  Orders Placed This Encounter   Procedures    XR HIP LEFT (2-3 VIEWS)     Standing Status:   Future     Number of Occurrences:   1     Standing Expiration Date:   7/10/2021    MRI HIP LEFT WO CONTRAST     Standing Status:   Future     Standing Expiration Date:   7/13/2021     Scheduling Instructions:      [facility] [phone]      MRI LT HIP W/O CONTRAST WITH CARTILAGE MAPPING AND MARS PROTOCOL      PAIN            SCHED: ONCE AUTHORIZED    CBC WITH AUTO DIFFERENTIAL     Standing Status:   Future     Number of Occurrences:   1     Standing Expiration Date:   7/13/2021    SEDIMENTATION RATE     Standing Status:   Future     Number of Occurrences:   1     Standing Expiration Date:   7/13/2021    C-REACTIVE PROTEIN     Standing Status:   Future     Number of Occurrences:   1     Standing Expiration Date:   7/13/2021       Treatment Plan:  I discussed with the patient the nature of osteoarthritis of the hip. We talked about treatment of arthritis and the various options that are involved with this. The patient understands that the treatments can vary from essentially doing nothing to a total joint replacement arthroplasty for arthritis.  I then went on to describe the utilization of glucosamine and chondroitin sulfate as a joint nutrition product. We talked about the fact that this is essentially a joint vitamin with typically minimal side effects. We also talked about utilization of prescription over-the-counter anti-inflammatory medications as the next option. We also talked about the corticosteroid injections and the fact that this can give a brief window of relief, but does not cure the problem; in fact, the pain often has a rebound effect in 6-10 weeks after the steroid has worn off. Lastly we discussed total joint replacement arthroplasty as the final and definitive step in treatment of arthritis. Patient realizes the magnitude of this type of treatment as well as having voiced a general understanding to the duration of the prosthesis. The patient voiced understanding to these continuum of treatment options. Because her x-rays did not appear to be significantly arthritic, I would recommend doing a MRI also to look for abductor tears, AVN etc. we would also like to make sure she does not have an infection. We are going to order a cartilage mapping MRI of the left hip along with CBC, CRP and ESR and she will follow-up after the MRI and the blood work is obtained for further discussion. If she was to have surgery, a lateral approach total hip replacement with robotic assistance would be preferable. She most probably does have some abductor tears which could be fixed at the same time. We also talked about perhaps a dual mobility type of configuration. Unfortunately, if she does have allergies to nickel, she may have to have a standard total hip with ceramic head. The liner of the dual mobility may have cobalt chrome, nickel. We will see her back after the MRI as well as the labs.

## 2020-07-14 LAB
BASOPHILS ABSOLUTE: 0.1 K/UL (ref 0–0.2)
BASOPHILS RELATIVE PERCENT: 1.2 %
C-REACTIVE PROTEIN: 0.4 MG/L (ref 0–5.1)
EOSINOPHILS ABSOLUTE: 0.3 K/UL (ref 0–0.6)
EOSINOPHILS RELATIVE PERCENT: 3.3 %
HCT VFR BLD CALC: 38.9 % (ref 36–48)
HEMOGLOBIN: 13.8 G/DL (ref 12–16)
LYMPHOCYTES ABSOLUTE: 2.6 K/UL (ref 1–5.1)
LYMPHOCYTES RELATIVE PERCENT: 33.4 %
MCH RBC QN AUTO: 33 PG (ref 26–34)
MCHC RBC AUTO-ENTMCNC: 35.4 G/DL (ref 31–36)
MCV RBC AUTO: 93.1 FL (ref 80–100)
MONOCYTES ABSOLUTE: 0.5 K/UL (ref 0–1.3)
MONOCYTES RELATIVE PERCENT: 7 %
NEUTROPHILS ABSOLUTE: 4.3 K/UL (ref 1.7–7.7)
NEUTROPHILS RELATIVE PERCENT: 55.1 %
PDW BLD-RTO: 12.2 % (ref 12.4–15.4)
PLATELET # BLD: 277 K/UL (ref 135–450)
PMV BLD AUTO: 8 FL (ref 5–10.5)
RBC # BLD: 4.18 M/UL (ref 4–5.2)
SEDIMENTATION RATE, ERYTHROCYTE: 9 MM/HR (ref 0–20)
WBC # BLD: 7.8 K/UL (ref 4–11)

## 2020-07-15 ENCOUNTER — TELEPHONE (OUTPATIENT)
Dept: ORTHOPEDIC SURGERY | Age: 40
End: 2020-07-15

## 2020-08-13 ENCOUNTER — OFFICE VISIT (OUTPATIENT)
Dept: ORTHOPEDIC SURGERY | Age: 40
End: 2020-08-13
Payer: COMMERCIAL

## 2020-08-13 PROCEDURE — 99214 OFFICE O/P EST MOD 30 MIN: CPT | Performed by: ORTHOPAEDIC SURGERY

## 2020-08-13 RX ORDER — GABAPENTIN 100 MG/1
CAPSULE ORAL
Qty: 30 CAPSULE | Refills: 0 | Status: SHIPPED | OUTPATIENT
Start: 2020-08-13 | End: 2020-08-23

## 2020-08-13 NOTE — PROGRESS NOTES
allergies, past medical, surgical, social and family histories were reviewed and updated as appropriate. Review of Systems:  Relevant review of systems reviewed and available in the patient's chart    Vital Signs: There were no vitals filed for this visit. General Exam:   Constitutional: Patient is adequately groomed with no evidence of malnutrition  DTRs: Deep tendon reflexes are intact  Mental Status: The patient is oriented to time, place and person. The patient's mood and affect are appropriate. Lymphatic: The lymphatic examination bilaterally reveals all areas to be without enlargement or induration. Vascular: Examination reveals no swelling or calf tenderness. Peripheral pulses are palpable and 2+. Neurological: The patient has good coordination. There is no weakness or sensory deficit. There is no height or weight on file to calculate BMI. Left hip Examination:    Inspection:  No erythema or signs of infection. There are no cutaneous lesions. There is a well-healed lateral incision noted over the left hip    Palpation: Left hip tenderness to palpation over the greater trochanteric region and along her incision. There is only mild groin pain. Patient does have hypersensitivity to touch over the lateral greater trochanteric region. Range of Motion:  Painful limited range of motion of the hip particularly with flexion and external rotation causing reproducible groin pain. Strength: -5/5 strength in flexion and abduction limited by pain. Special Tests: There is a positive log roll maneuver. Positive straight leg raise against resistance. Positive Rudy's test.  Negative Homans test.    Skin: There are no rashes, ulcerations or lesions. Gait: Antalgic favoring the left side           Additional Examinations:         Contralateral Exam: Examination of the right hip reveals intact skin.  The patient demonstrates full painless range of motion with regards to flexion, abduction, internal and external rotation. There is no tenderness about the greater trochanter. There is a negative straight leg raise against resistance. Strength is 5/5 throughout all planes. Lower Back: Examination of the lower back does not show any tenderness, deformity or injury. Range of motion is unremarkable. There is no gross instability. There are no rashes, ulcerations or lesions. Strength and tone are normal.    Radiology:       X-rays were ordered today and reviewed. 2 views. AP pelvis and lateral left hip. They demonstrate no evidence of fractures or dislocations. 2 screws present consistent with previous ORIF. Site: [facility] #: [id number] #: [id number] Procedure: MR Left Hip w/o Contrast  ATTN: cartilage mapping; Reason for Exam: pain of left hip, primary osteoarthritis of right hip    This document is confidential medical information.  Unauthorized disclosure or use of this information is prohibited by law. If you are not the intended recipient of this document, please advise us by calling immediately [phone].         [facility], [address] [place] [address]              Patient Name: Suraj Melton    Case ID: [id number]    Patient : 1980    Referring Physician: Araceli Justin MD    Exam Date: 2020    Exam Description: MR Left Hip w/o Contrast  ATTN: cartilage mapping              HISTORY:  Left hip pain.         TECHNICAL FACTORS:  Long- and short-axis fat- and water-weighted images were performed.         COMPARISON:  None.         FINDINGS:  Coxa valgus.  No acute fracture or contusion. No AVN of the femoral heads.         Surgical hardware in the left greater trochanter. Decreased left femoroacetabular joint space    with chondral thinning.  No osseous erosion.  Anteversion of the femoral  Mild spurring of the    acetabulum.  No acute labral tear.  No paralabral cysts.  No joint effusion.         No acute muscle or tendon strain.  No trochanteric bursitis.         No lymphadenopathy in the visualized pelvis.         CONCLUSION:    Mild left hip arthrosis with chondral thinning and subtle spurring.  No osseous erosion.         Thank you for the opportunity to provide your interpretation. Impression:  Encounter Diagnoses   Name Primary?  Neuritis Yes    Pain of left hip joint     Greater trochanteric bursitis of left hip     Snapping hip syndrome, left        Office Procedures:  Orders Placed This Encounter   Procedures    [facility]     Referral Priority:   Routine     Referral Type:   Eval and Treat     Referral Reason:   Specialty Services Required     Requested Specialty:   Physical Therapy     Number of Visits Requested:   1       Treatment Plan: Patient has mild osteoarthritis involving her hip. I believe she is more suffering from IT band snapping hip syndrome and neuritis. Have recommended physical therapy. She will also be placed on Neurontin 100 mg qhs to start. We will then increase her to 200 mg and 300 mg slowly. In addition she will try Lidoderm patches. Patient will follow-up in 4 weeks for a virtual visit.

## 2020-08-24 ENCOUNTER — HOSPITAL ENCOUNTER (OUTPATIENT)
Dept: PHYSICAL THERAPY | Age: 40
Setting detail: THERAPIES SERIES
Discharge: HOME | End: 2020-08-24
Payer: COMMERCIAL

## 2020-08-24 PROCEDURE — 97162 PT EVAL MOD COMPLEX 30 MIN: CPT

## 2020-08-24 PROCEDURE — 97140 MANUAL THERAPY 1/> REGIONS: CPT

## 2020-08-24 NOTE — FLOWSHEET NOTE
[facility] [employer]    Physical Therapy Treatment Note/ Progress Report:     Date:  2020    Patient Name:  Marielle Victor    :  1980  MRN: [id number]  Medical/Treatment Diagnosis Information:  · Diagnosis: Left hip pain (M25.551), greater trochanteric bursitis of left hip (M70.61), left snapping hip syndrome (M24.851)  · Treatment Diagnosis: Left hip pain  Insurance/Certification information:  PT Insurance Information: [facility] - $6500 deductible, $6500 OOP max, $10 co-pay, 100% co-insurance, 20 PT visits per calendar year (2 previously used)  Physician Information:  Referring Practitioner: Dr. Brianna Porter of care signed (Y/N):     Date of Patient follow up with Physician: 9/10/20     Progress Report: []  Yes  [x]  No     Functional Scale: LEFS = 69%   Date: 20    Date Range for reporting period:  Beginnin20  Ending:      Progress report due (10 Rx/or 30 days whichever is less):      Recertification due (POC duration/ or 90 days whichever is less): 10/19/20     Visit # Insurance Allowable Auth Needed   1 18 - auth required after 10 visits [x]Yes    []No     Pain level:  4-7/10     SUBJECTIVE:  See eval    OBJECTIVE: See eval   Observation:    Test measurements:      RESTRICTIONS/PRECAUTIONS: Hx of L hip sx 19 - s/p open surgical hip dislocation with trochanteric osteotomy, debridement of femoral head and microfracture; EDS; B hip dysplasia (L>R), OA    Exercises/Interventions:     Therapeutic Ex 5' Resistance Sets/sec Reps Notes          Modified figure 4 piriformis stretch  20\" 3x    Hooklying add BS  5\" 10    Supine clamshells yellow 1 10                                                                                        Manual Intervention 10'       Knee mobs/PROM       Tib/Fem Mobs       Patella Mobs       Ankle mobs       STM/gentle stick 10'   Proximal to mid ITB, gluteus medius, gluteus uriel NMR re-education                                                                          Therapeutic Exercise and NMR EXR  [x] (60639) Provided verbal/tactile cueing for activities related to strengthening, flexibility, endurance, ROM for improvements in LE, proximal hip, and core control with self care, mobility, lifting, ambulation.  [] (54408) Provided verbal/tactile cueing for activities related to improving balance, coordination, kinesthetic sense, posture, motor skill, proprioception  to assist with LE, proximal hip, and core control in self care, mobility, lifting, ambulation and eccentric single leg control.      NMR and Therapeutic Activities:    [] (87568 or 65396) Provided verbal/tactile cueing for activities related to improving balance, coordination, kinesthetic sense, posture, motor skill, proprioception and motor activation to allow for proper function of core, proximal hip and LE with self care and ADLs  [] (50950) Gait Re-education- Provided training and instruction to the patient for proper LE, core and proximal hip recruitment and positioning and eccentric body weight control with ambulation re-education including up and down stairs     Home Exercise Program:    [x] (34049) Reviewed/Progressed HEP activities related to strengthening, flexibility, endurance, ROM of core, proximal hip and LE for functional self-care, mobility, lifting and ambulation/stair navigation   [] (63214)Reviewed/Progressed HEP activities related to improving balance, coordination, kinesthetic sense, posture, motor skill, proprioception of core, proximal hip and LE for self care, mobility, lifting, and ambulation/stair navigation      Manual Treatments:  PROM / STM / Oscillations-Mobs:  G-I, II, III, IV (PA's, Inf., Post.)  [x] (34651) Provided manual therapy to mobilize LE, proximal hip and/or LS spine soft tissue/joints for the purpose of modulating pain, promoting relaxation,  increasing ROM, reducing/eliminating soft increased symptoms or restriction. []? Progressing: []? Met: []? Not Met: []? Adjusted  5. Patient will be able to walk for 15 minutes without increased symptoms or restriction. []? Progressing: []? Met: []? Not Met: []? Adjusted     Progression Towards Functional goals:  [] Patient is progressing as expected towards functional goals listed. [] Progression is slowed due to complexities listed. [] Progression has been slowed due to co-morbidities. [x] Plan just implemented, too soon to assess goals progression  [] Other:     ASSESSMENT:  See eval    Return to Play: (if applicable)   []  Stage 1: Intro to Strength   []  Stage 2: Return to Run and Strength   []  Stage 3: Return to Jump and Strength   []  Stage 4: Dynamic Strength and Agility   []  Stage 5: Sport Specific Training     []  Ready to Return to Play, Meets All Above Stages   []  Not Ready for Return to Sports   Comments:            Treatment/Activity Tolerance:  [x] Patient tolerated treatment well [] Patient limited by fatique  [] Patient limited by pain  [] Patient limited by other medical complications  [] Other:     Overall Progression Towards Functional goals/ Treatment Progress Update:  [] Patient is progressing as expected towards functional goals listed. [] Progression is slowed due to complexities/Impairments listed. [] Progression has been slowed due to co-morbidities.   [x] Plan just implemented, too soon to assess goals progression <30days   [] Goals require adjustment due to lack of progress  [] Patient is not progressing as expected and requires additional follow up with physician  [] Other    Prognosis for POC: [] Good [x] Fair  [] Poor    Patient requires continued skilled intervention: [x] Yes  [] No        PLAN: See eval  [] Continue per plan of care [] Alter current plan (see comments)  [x] Plan of care initiated [] Hold pending MD visit [] Discharge    Electronically signed by: Robin Helton PT    Note: If patient does not return for scheduled/recommended follow up visits, this note will serve as a discharge from care along with the most recent update on progress.

## 2020-08-24 NOTE — PLAN OF CARE
[email or web address]  [address] [place] [address]  Phone [phone]   Fax [phone]                                                       Physical Therapy Certification    Dear Referring Practitioner: Dr. Eddie Leblanc,    We had the pleasure of evaluating the following patient for physical therapy services at [address] [place] [address]. A summary of our findings can be found in the initial assessment below. This includes our plan of care. If you have any questions or concerns regarding these findings, please do not hesitate to contact me at the office phone number checked above.   Thank you for the referral.       Physician Signature:_______________________________Date:__________________  By signing above (or electronic signature), therapists plan is approved by physician      Patient: Monica Hunter   : 1980   MRN: [id number]  Referring Physician: Referring Practitioner: Dr. Eddie Leblanc      Evaluation Date: 2020      Medical Diagnosis Information:  Diagnosis: Left hip pain (M25.551), greater trochanteric bursitis of left hip (M70.61), left snapping hip syndrome (M24.851)   Treatment Diagnosis: Left hip pain                                         Insurance information: PT Insurance Information: [facility] - $6500 deductible, $6500 OOP max, $10 co-pay, 100% co-insurance, 20 PT visits per calendar year (2 previously used)     Precautions/ Contra-indications: EDS    C-SSRS Triggered by Intake questionnaire (Past 2 wk assessment):   [x] No, Questionnaire did not trigger screening.   [] Yes, Patient intake triggered further evaluation      [] C-SSRS Screening completed  [] PCP notified via Plan of Care  [] Emergency services notified     Latex Allergy:  [x]NO      []YES  Preferred Language for Healthcare:   [x]English       []other:    SUBJECTIVE: Patient stated complaint:  Pt reports that she had sx 6/26/19 - s/p open surgical hip dislocation with trochanteric osteotomy, debridement of femoral head and microfracture. About 8 weeks into PT, she started having extreme pain and swelling in L hip. Dr. Sophia Edwards (surgeon) diagnosed pt with glut max strain and GT bursitis, and had pt stop all PT at that point. Pt was planning on having TKA, but decided to put this on hold and get 2nd opinion. Pt has seen Dr. Mitch Augustin in the meantime, who told pt he would like to try other options (PT and medications) prior to TKA due to pt's age. Currently, pt reports having pain in groin, lateral aspect of hip and posterior buttocks. Pt also has a burning pain in posterior buttocks to mid posterior thigh. Pt states that it feels like she is \"sitting on rocks\" on her L side. Pain is worst with sitting, standing/walking after prolonged sitting, sleeping, walking and standing. Dr. Mitch Augustin prescribed gabapentin, but pt has not yet taken due to fear of side effects. Pt also has not tried lidoderm patches yet. Pt used crutches previously, but has not used them in a couple of months. Pt reports LBP is much improved, just c/o L hip pain pain today. 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     OBJECTIVE:     ROM LEFT RIGHT   HIP Flex 75 *pn 95   HIP Abd     HIP Ext     HIP IR 20 *pn 20   HIP ER 25 *pn 50   Knee ext     Knee Flex     Ankle []Hypothyroid (E03.9)  []Hyperthyroid Gastrointestinal conditions   []Constipation (A97.47)   Metabolic conditions   []Morbid obesity (E66.01)  []Diabetes type 1(E10.65) or 2 (E11.65)   []Neuropathy (G60.9)     Pulmonary conditions   []Asthma (J45)  []Coughing   []COPD (J44.9)   Psychological Disorders  [x]Anxiety (F41.9)  []Depression (F32.9)   []Other:   [x]Other:   EDS       Barriers to/and or personal factors that will affect rehab potential:              [x]Age  []Sex              []Motivation/Lack of Motivation                        [x]Co-Morbidities              []Cognitive Function, education/learning barriers              []Environmental, home barriers              []profession/work barriers  [x]past PT/medical experience  []other:  Justification:     Falls Risk Assessment (30 days):   [x] Falls Risk assessed and no intervention required.   [] Falls Risk assessed and Patient requires intervention due to being higher risk   TUG score (>12s at risk):     [] Falls education provided, including         ASSESSMENT:   Functional Impairments:     [x]Noted lumbar/proximal hip/LE joint hypomobility   [x]Decreased LE functional ROM   [x]Decreased core/proximal hip strength and neuromuscular control   [x]Decreased LE functional strength   []Reduced balance/proprioceptive control   []other:      Functional Activity Limitations (from functional questionnaire and intake)   [x]Reduced ability to tolerate prolonged functional positions   [x]Reduced ability or difficulty with changes of positions or transfers between positions   [x]Reduced ability to maintain good posture and demonstrate good body mechanics with sitting, bending, and lifting   [x]Reduced ability to sleep   [x] Reduced ability or tolerance with driving and/or computer work   [x]Reduced ability to perform lifting, carrying tasks   [x]Reduced ability to squat   []Reduced ability to forward bend   [x]Reduced ability to ambulate prolonged functional periods/distances/surfaces   [x]Reduced ability to ascend/descend stairs   []Reduced ability to run, hop, cut or jump   []other:    Participation Restrictions   [x]Reduced participation in self care activities   [x]Reduced participation in home management activities   []Reduced participation in work activities   [x]Reduced participation in social activities. [x]Reduced participation in sport/recreation activities. Classification :    []Signs/symptoms consistent with post-surgical status including decreased ROM, strength and function.    []Signs/symptoms consistent with joint sprain/strain   []Signs/symptoms consistent with patella-femoral syndrome   []Signs/symptoms consistent with knee OA/hip OA   []Signs/symptoms consistent with internal derangement of knee/Hip   [x]Signs/symptoms consistent with functional hip weakness/NMR control      [x]Signs/symptoms consistent with tendinitis/tendinosis    []signs/symptoms consistent with pathology which may benefit from Dry needling      []other:      Prognosis/Rehab Potential:      []Excellent   [x]Good    []Fair   []Poor    Tolerance of evaluation/treatment:    []Excellent   []Good    [x]Fair   []Poor    Physical Therapy Evaluation Complexity Justification  [x] A history of present problem with:  [] no personal factors and/or comorbidities that impact the plan of care;  []1-2 personal factors and/or comorbidities that impact the plan of care  [x]3 personal factors and/or comorbidities that impact the plan of care  [x] An examination of body systems using standardized tests and measures addressing any of the following: body structures and functions (impairments), activity limitations, and/or participation restrictions;:  [] a total of 1-2 or more elements   [x] a total of 3 or more elements   [] a total of 4 or more elements   [x] A clinical presentation with:  [] stable and/or uncomplicated characteristics   [x] evolving clinical presentation with changing Progressing: [] Met: [] Not Met: [] Adjusted  3. Patient will demonstrate an increase in Strength to good proximal hip strength and control, within 5lb HHD in LE to allow for proper functional mobility as indicated by patients Functional Deficits. [] Progressing: [] Met: [] Not Met: [] Adjusted  4. Patient will return to sitting for 1 hour without increased symptoms or restriction. [] Progressing: [] Met: [] Not Met: [] Adjusted  5. Patient will be able to walk for 15 minutes without increased symptoms or restriction.      [] Progressing: [] Met: [] Not Met: [] Adjusted     Electronically signed by:  Eliud Martin, PT

## 2020-08-31 ENCOUNTER — HOSPITAL ENCOUNTER (OUTPATIENT)
Dept: PHYSICAL THERAPY | Age: 40
Setting detail: THERAPIES SERIES
Discharge: HOME | End: 2020-08-31
Payer: COMMERCIAL

## 2020-08-31 PROCEDURE — 97530 THERAPEUTIC ACTIVITIES: CPT

## 2020-08-31 PROCEDURE — 97140 MANUAL THERAPY 1/> REGIONS: CPT

## 2020-08-31 PROCEDURE — 97110 THERAPEUTIC EXERCISES: CPT

## 2020-08-31 NOTE — FLOWSHEET NOTE
[employer]    Physical Therapy Treatment Note/ Progress Report:     Date:  2020    Patient Name:  Spencer Gaston    :  1980  MRN: [id number]  Medical/Treatment Diagnosis Information:  · Diagnosis: Left hip pain (M25.551), greater trochanteric bursitis of left hip (M70.61), left snapping hip syndrome (M24.851)  · Treatment Diagnosis: Left hip pain  Insurance/Certification information:  [place] [facility] [place], $6500 OOP max, $10 co-pay, 100% co-insurance, 20 PT visits per calendar year (2 previously used)  Physician Information:  Referring Practitioner: Dr. Truong Part of care signed (Y/N):     Date of Patient follow up with Physician: 9/10/20     Progress Report: []  Yes  [x]  No     Functional Scale: LEFS = 69%   Date: 20    Date Range for reporting period:  Beginnin20  Ending:      Progress report due (10 Rx/or 30 days whichever is less):      Recertification due (POC duration/ or 90 days whichever is less): 10/19/20     Visit # Insurance Allowable Auth Needed   2 18 - auth required after 10 visits [x]Yes    []No     Pain level:  4-710     SUBJECTIVE:  Pt reports that she was more active this weekend and reports having increased soreness in L buttocks area. Pt reports that she has been compliant with HEP and does feel good to get back to doing exercises, but fearful of overdoing things when she is outside of the home.      OBJECTIVE: See eval   Observation:    Test measurements:      RESTRICTIONS/PRECAUTIONS: Hx of L hip sx 19 - s/p open surgical hip dislocation with trochanteric osteotomy, debridement of femoral head and microfracture; EDS; B hip dysplasia (L>R), OA    Exercises/Interventions:     Therapeutic Ex 30' Resistance Sets/sec Reps Notes   Bike  3'  For ROM   Modified figure 4 piriformis stretch  20\" 3x    Hooklying add BS  5\" 10    Supine clamshells yellow 1 10    SAQ  1 10    Supine marches  1 10 Alternating, B LE   Seated sciatic nerve glides  1 10    Patient education/TA 10'   Prognosis, diagnosis, pain cycle                                                                  Manual Intervention 15'       Knee mobs/PROM       Tib/Fem Mobs       Patella Mobs       Ankle mobs       STM/gentle stick 15'   Proximal to mid ITB, gluteus medius, gluteus uriel           NMR re-education              Standing glut med activation    NPV? Therapeutic Exercise and NMR EXR  [x] (56205) Provided verbal/tactile cueing for activities related to strengthening, flexibility, endurance, ROM for improvements in LE, proximal hip, and core control with self care, mobility, lifting, ambulation.  [] (08462) Provided verbal/tactile cueing for activities related to improving balance, coordination, kinesthetic sense, posture, motor skill, proprioception  to assist with LE, proximal hip, and core control in self care, mobility, lifting, ambulation and eccentric single leg control.      NMR and Therapeutic Activities:    [x] (25135 or 75695) Provided verbal/tactile cueing for activities related to improving balance, coordination, kinesthetic sense, posture, motor skill, proprioception and motor activation to allow for proper function of core, proximal hip and LE with self care and ADLs  [] (41988) Gait Re-education- Provided training and instruction to the patient for proper LE, core and proximal hip recruitment and positioning and eccentric body weight control with ambulation re-education including up and down stairs     Home Exercise Program:    [x] (59434) Reviewed/Progressed HEP activities related to strengthening, flexibility, endurance, ROM of core, proximal hip and LE for functional self-care, mobility, lifting and ambulation/stair navigation   [] (41721)Reviewed/Progressed HEP activities related to improving balance, coordination, kinesthetic sense, posture, motor skill, proprioception of core, proximal hip and LE for self care, mobility, lifting, and ambulation/stair navigation      Manual Treatments:  PROM / STM / Oscillations-Mobs:  G-I, II, III, IV (PA's, Inf., Post.)  [x] (46957) Provided manual therapy to mobilize LE, proximal hip and/or LS spine soft tissue/joints for the purpose of modulating pain, promoting relaxation,  increasing ROM, reducing/eliminating soft tissue swelling/inflammation/restriction, improving soft tissue extensibility and allowing for proper ROM for normal function with self care, mobility, lifting and ambulation. Modalities: Ice to go     Charges:  Timed Code Treatment Minutes: 45   Total Treatment Minutes: 50       [] EVAL (LOW) 66837 (typically 20 minutes face-to-face)  [] EVAL (MOD) 34488 (typically 30 minutes face-to-face)  [] EVAL (HIGH) 80853 (typically 45 minutes face-to-face)  [] RE-EVAL     [x] FF(38695) x 1  [] IONTO (94542)  [] NMR (27074) x     [] VASO (62243)  [x] Manual (82788) x  1   [] Other:  [x] TA (37275)x 1    [] Mech Traction (21461)  [] ES(attended) (58752)     [] ES (un) (94295):     GOALS:   Patient stated goal: \"To reduce pain and to walk without a limp\"  []? Progressing: []? Met: []? Not Met: []? Adjusted     Therapist goals for Patient:   Short Term Goals: To be achieved in: 2 weeks  1. Independent in HEP and progression per patient tolerance, in order to prevent re-injury. []? Progressing: []? Met: []? Not Met: []? Adjusted  2. Patient will have a decrease in pain to facilitate improvement in movement, function, and ADLs as indicated by Functional Deficits. []? Progressing: []? Met: []? Not Met: []? Adjusted     Long Term Goals: To be achieved in: 6-8 weeks  1. Disability index score of 50% or less for the LEFS to assist with reaching prior level of function. []? Progressing: []? Met: []? Not Met: []? Adjusted  2.  Patient will demonstrate increased AROM to WNL for l hip to allow for proper joint functioning as indicated by patients Functional Deficits. []? Progressing: []? Met: []? Not Met: []? Adjusted  3. Patient will demonstrate an increase in Strength to good proximal hip strength and control, within 5lb HHD in LE to allow for proper functional mobility as indicated by patients Functional Deficits. []? Progressing: []? Met: []? Not Met: []? Adjusted  4. Patient will return to sitting for 1 hour without increased symptoms or restriction. []? Progressing: []? Met: []? Not Met: []? Adjusted  5. Patient will be able to walk for 15 minutes without increased symptoms or restriction. []? Progressing: []? Met: []? Not Met: []? Adjusted     Progression Towards Functional goals:  [] Patient is progressing as expected towards functional goals listed. [] Progression is slowed due to complexities listed. [] Progression has been slowed due to co-morbidities. [x] Plan just implemented, too soon to assess goals progression  [] Other:     ASSESSMENT:  Good tolerance to exercise progressions today with fatigue noted at end of session. Pt continues to ambulate with forward flexed posture and decreased hip extension bilaterally, requiring cues throughout session for appropriate gait mechanics. Return to Play: (if applicable)   []  Stage 1: Intro to Strength   []  Stage 2: Return to Run and Strength   []  Stage 3: Return to Jump and Strength   []  Stage 4: Dynamic Strength and Agility   []  Stage 5: Sport Specific Training     []  Ready to Return to Play, Meets All Above Stages   []  Not Ready for Return to Sports   Comments:            Treatment/Activity Tolerance:  [] Patient tolerated treatment well [x] Patient limited by fatique  [] Patient limited by pain  [] Patient limited by other medical complications  [] Other:     Overall Progression Towards Functional goals/ Treatment Progress Update:  [] Patient is progressing as expected towards functional goals listed.     [] Progression is slowed due to complexities/Impairments listed. [] Progression has been slowed due to co-morbidities. [x] Plan just implemented, too soon to assess goals progression <30days   [] Goals require adjustment due to lack of progress  [] Patient is not progressing as expected and requires additional follow up with physician  [] Other    Prognosis for POC: [] Good [x] Fair  [] Poor    Patient requires continued skilled intervention: [x] Yes  [] No        PLAN: Progress hip and core strength as tolerated by pt. Decrease L hip and buttocks pain and radicular sxs. [x] Continue per plan of care [] Alter current plan (see comments)  [] Plan of care initiated [] Hold pending MD visit [] Discharge    Electronically signed by: Shawanda Fitzgerald PT    Note: If patient does not return for scheduled/recommended follow up visits, this note will serve as a discharge from care along with the most recent update on progress.

## 2020-09-08 ENCOUNTER — HOSPITAL ENCOUNTER (OUTPATIENT)
Dept: PHYSICAL THERAPY | Age: 40
Setting detail: THERAPIES SERIES
Discharge: HOME | End: 2020-09-08
Payer: COMMERCIAL

## 2020-09-08 PROCEDURE — 97110 THERAPEUTIC EXERCISES: CPT

## 2020-09-08 PROCEDURE — 97140 MANUAL THERAPY 1/> REGIONS: CPT

## 2020-09-08 NOTE — FLOWSHEET NOTE
[employer]    Physical Therapy Treatment Note/ Progress Report:     Date:  2020    Patient Name:  Frances Case    :  1980  MRN: [id number]  Medical/Treatment Diagnosis Information:  · Diagnosis: Left hip pain (M25.551), greater trochanteric bursitis of left hip (M70.61), left snapping hip syndrome (M24.851)  · Treatment Diagnosis: Left hip pain  Insurance/Certification information:  PT Insurance Information: [facility] - $6500 deductible, $6500 OOP max, $10 co-pay, 100% co-insurance, 20 PT visits per calendar year (2 previously used)  Physician Information:  Referring Practitioner: Dr. Jeri Rasheed of care signed (Y/N):     Date of Patient follow up with Physician: 9/10/20     Progress Report: []  Yes  [x]  No     Functional Scale: LEFS = 69%   Date: 20    Date Range for reporting period:  Beginnin20  Ending:      Progress report due (10 Rx/or 30 days whichever is less):      Recertification due (POC duration/ or 90 days whichever is less): 10/19/20     Visit # Insurance Allowable Auth Needed   3 18 - auth required after 10 visits [x]Yes    []No     Pain level:  4-7/10     SUBJECTIVE:  Pt reports that she was walking on uneven surfaces and was pretty sore the rest of the day and even reported some soreness and clicking in R hip afterwards. Otherwise, hip has been feeling better overall and feels less soreness and pain. Pt reports this is the best her hip has felt in months. Pt is still very fearful of overdoing it.      OBJECTIVE: See eval   Observation:    Test measurements:      RESTRICTIONS/PRECAUTIONS: Hx of L hip sx 19 - s/p open surgical hip dislocation with trochanteric osteotomy, debridement of femoral head and microfracture; EDS; B hip dysplasia (L>R), OA    Exercises/Interventions:     Therapeutic Ex 25' Resistance Sets/sec Reps Notes   Bike  3'  For ROM   Modified figure 4 piriformis stretch 20\" 3x    Bridges  5\" 10 Decreased ROM   Supine clamshells yellow 1 10    SAQ  1 10    Supine marches  1 10 Alternating, B LE   Seated sciatic nerve glides  1 10    Patient education/TA   Prognosis, diagnosis, pain cycle                                                                  Manual Intervention 15'       Knee mobs/PROM       Tib/Fem Mobs       Patella Mobs       Ankle mobs       STM/gentle stick 15'   Proximal to mid ITB, gluteus medius, gluteus uriel, proximal hamstring          NMR re-education 3'              Standing glut med activation  1 10 Alternating B LE                                                        Therapeutic Exercise and NMR EXR  [x] (03699) Provided verbal/tactile cueing for activities related to strengthening, flexibility, endurance, ROM for improvements in LE, proximal hip, and core control with self care, mobility, lifting, ambulation.  [] (65983) Provided verbal/tactile cueing for activities related to improving balance, coordination, kinesthetic sense, posture, motor skill, proprioception  to assist with LE, proximal hip, and core control in self care, mobility, lifting, ambulation and eccentric single leg control.      NMR and Therapeutic Activities:    [x] (35401 or 92013) Provided verbal/tactile cueing for activities related to improving balance, coordination, kinesthetic sense, posture, motor skill, proprioception and motor activation to allow for proper function of core, proximal hip and LE with self care and ADLs  [] (73520) Gait Re-education- Provided training and instruction to the patient for proper LE, core and proximal hip recruitment and positioning and eccentric body weight control with ambulation re-education including up and down stairs     Home Exercise Program:    [x] (34069) Reviewed/Progressed HEP activities related to strengthening, flexibility, endurance, ROM of core, proximal hip and LE for functional self-care, mobility, lifting and ambulation/stair navigation   [] (51778)Reviewed/Progressed HEP activities related to improving balance, coordination, kinesthetic sense, posture, motor skill, proprioception of core, proximal hip and LE for self care, mobility, lifting, and ambulation/stair navigation      Manual Treatments:  PROM / STM / Oscillations-Mobs:  G-I, II, III, IV (PA's, Inf., Post.)  [x] (24659) Provided manual therapy to mobilize LE, proximal hip and/or LS spine soft tissue/joints for the purpose of modulating pain, promoting relaxation,  increasing ROM, reducing/eliminating soft tissue swelling/inflammation/restriction, improving soft tissue extensibility and allowing for proper ROM for normal function with self care, mobility, lifting and ambulation. Modalities: Ice to go     Charges:  Timed Code Treatment Minutes: 43   Total Treatment Minutes: 43       [] EVAL (LOW) 50603 (typically 20 minutes face-to-face)  [] EVAL (MOD) 25010 (typically 30 minutes face-to-face)  [] EVAL (HIGH) 72345 (typically 45 minutes face-to-face)  [] RE-EVAL     [x] MICHAEL(24846) x 2  [] IONTO (26855)  [] NMR (75402) x     [] VASO (26200)  [x] Manual (20618) x  1   [] Other:  [] TA (65262)x     [] Mech Traction (07171)  [] ES(attended) (18352)     [] ES (un) (70755):     GOALS:   Patient stated goal: \"To reduce pain and to walk without a limp\"  []? Progressing: []? Met: []? Not Met: []? Adjusted     Therapist goals for Patient:   Short Term Goals: To be achieved in: 2 weeks  1. Independent in HEP and progression per patient tolerance, in order to prevent re-injury. []? Progressing: []? Met: []? Not Met: []? Adjusted  2. Patient will have a decrease in pain to facilitate improvement in movement, function, and ADLs as indicated by Functional Deficits. []? Progressing: []? Met: []? Not Met: []? Adjusted     Long Term Goals: To be achieved in: 6-8 weeks  1. Disability index score of 50% or less for the LEFS to assist with reaching prior level of function. []?  Progressing: expected towards functional goals listed. [] Progression is slowed due to complexities/Impairments listed. [] Progression has been slowed due to co-morbidities. [x] Plan just implemented, too soon to assess goals progression <30days   [] Goals require adjustment due to lack of progress  [] Patient is not progressing as expected and requires additional follow up with physician  [] Other    Prognosis for POC: [] Good [x] Fair  [] Poor    Patient requires continued skilled intervention: [x] Yes  [] No        PLAN: Progress hip and core strength as tolerated by pt. Decrease L hip and buttocks pain and radicular sxs. Continue to monitor level of soreness with progressions today. [x] Continue per plan of care [] Alter current plan (see comments)  [] Plan of care initiated [] Hold pending MD visit [] Discharge    Electronically signed by: Ilia Crowell PT    Note: If patient does not return for scheduled/recommended follow up visits, this note will serve as a discharge from care along with the most recent update on progress.

## 2020-09-14 ENCOUNTER — HOSPITAL ENCOUNTER (OUTPATIENT)
Dept: PHYSICAL THERAPY | Age: 40
Setting detail: THERAPIES SERIES
Discharge: HOME | End: 2020-09-14
Payer: COMMERCIAL

## 2020-09-14 PROCEDURE — 97140 MANUAL THERAPY 1/> REGIONS: CPT

## 2020-09-14 PROCEDURE — 97110 THERAPEUTIC EXERCISES: CPT

## 2020-09-14 NOTE — FLOWSHEET NOTE
1 10 Alternating, B LE   Seated sciatic nerve glides  1 10 Performed in supine   Patient education/TA   Prognosis, diagnosis, pain cycle   Leg press DL 40# 1 15 w/BS                                                           Manual Intervention 15'       Knee mobs/PROM       Tib/Fem Mobs       Patella Mobs       Ankle mobs       STM/gentle stick 15'   Proximal to mid ITB, gluteus medius, gluteus uriel, proximal hamstring          NMR re-education 3'              Standing glut med activation  1 10 Alternating B LE                                                        Therapeutic Exercise and NMR EXR  [x] (95942) Provided verbal/tactile cueing for activities related to strengthening, flexibility, endurance, ROM for improvements in LE, proximal hip, and core control with self care, mobility, lifting, ambulation.  [] (19553) Provided verbal/tactile cueing for activities related to improving balance, coordination, kinesthetic sense, posture, motor skill, proprioception  to assist with LE, proximal hip, and core control in self care, mobility, lifting, ambulation and eccentric single leg control.      NMR and Therapeutic Activities:    [x] (07072 or 61596) Provided verbal/tactile cueing for activities related to improving balance, coordination, kinesthetic sense, posture, motor skill, proprioception and motor activation to allow for proper function of core, proximal hip and LE with self care and ADLs  [] (14533) Gait Re-education- Provided training and instruction to the patient for proper LE, core and proximal hip recruitment and positioning and eccentric body weight control with ambulation re-education including up and down stairs     Home Exercise Program:    [x] (43028) Reviewed/Progressed HEP activities related to strengthening, flexibility, endurance, ROM of core, proximal hip and LE for functional self-care, mobility, lifting and ambulation/stair navigation   [] (27554)Reviewed/Progressed HEP activities related to improving balance, coordination, kinesthetic sense, posture, motor skill, proprioception of core, proximal hip and LE for self care, mobility, lifting, and ambulation/stair navigation      Manual Treatments:  PROM / STM / Oscillations-Mobs:  G-I, II, III, IV (PA's, Inf., Post.)  [x] (40619) Provided manual therapy to mobilize LE, proximal hip and/or LS spine soft tissue/joints for the purpose of modulating pain, promoting relaxation,  increasing ROM, reducing/eliminating soft tissue swelling/inflammation/restriction, improving soft tissue extensibility and allowing for proper ROM for normal function with self care, mobility, lifting and ambulation. Modalities: Ice to go     Charges:  Timed Code Treatment Minutes: 43   Total Treatment Minutes: 43       [] EVAL (LOW) 56973 (typically 20 minutes face-to-face)  [] EVAL (MOD) 93842 (typically 30 minutes face-to-face)  [] EVAL (HIGH) 72389 (typically 45 minutes face-to-face)  [] RE-EVAL     [x] RT(37788) x 2  [] IONTO (17098)  [] NMR (42300) x     [] VASO (56967)  [x] Manual (05603) x  1   [] Other:  [] TA (60048)x     [] Mech Traction (49611)  [] ES(attended) (00073)     [] ES (un) (56854):     GOALS:   Patient stated goal: \"To reduce pain and to walk without a limp\"  []? Progressing: []? Met: []? Not Met: []? Adjusted     Therapist goals for Patient:   Short Term Goals: To be achieved in: 2 weeks  1. Independent in HEP and progression per patient tolerance, in order to prevent re-injury. []? Progressing: []? Met: []? Not Met: []? Adjusted  2. Patient will have a decrease in pain to facilitate improvement in movement, function, and ADLs as indicated by Functional Deficits. []? Progressing: []? Met: []? Not Met: []? Adjusted     Long Term Goals: To be achieved in: 6-8 weeks  1. Disability index score of 50% or less for the LEFS to assist with reaching prior level of function. []? Progressing: []? Met: []? Not Met: []? Adjusted  2.  Patient will demonstrate increased AROM to WNL for l hip to allow for proper joint functioning as indicated by patients Functional Deficits. []? Progressing: []? Met: []? Not Met: []? Adjusted  3. Patient will demonstrate an increase in Strength to good proximal hip strength and control, within 5lb HHD in LE to allow for proper functional mobility as indicated by patients Functional Deficits. []? Progressing: []? Met: []? Not Met: []? Adjusted  4. Patient will return to sitting for 1 hour without increased symptoms or restriction. []? Progressing: []? Met: []? Not Met: []? Adjusted  5. Patient will be able to walk for 15 minutes without increased symptoms or restriction. []? Progressing: []? Met: []? Not Met: []? Adjusted     Progression Towards Functional goals:  [] Patient is progressing as expected towards functional goals listed. [] Progression is slowed due to complexities listed. [] Progression has been slowed due to co-morbidities. [x] Plan just implemented, too soon to assess goals progression  [] Other:     ASSESSMENT:  Pt continues to demonstrate improved tolerance to STM/stick to lateral hip and hamstring musculature today. Pt tolerated addition of DL leg press with with no increased hip pain noted. Modified sciatic nerve glides to be performed in supine due to pt having difficulty doing this in seated position at home due to height issues. Pt continues to ambulate with mild antalgic gait pattern, but much better than it previously was.      Return to Play: (if applicable)   []  Stage 1: Intro to Strength   []  Stage 2: Return to Run and Strength   []  Stage 3: Return to Jump and Strength   []  Stage 4: Dynamic Strength and Agility   []  Stage 5: Sport Specific Training     []  Ready to Return to Play, Meets All Above Stages   []  Not Ready for Return to Sports   Comments:            Treatment/Activity Tolerance:  [] Patient tolerated treatment well [x] Patient limited by fatique  [] Patient limited by pain  [] Patient limited by other medical complications  [] Other:     Overall Progression Towards Functional goals/ Treatment Progress Update:  [] Patient is progressing as expected towards functional goals listed. [] Progression is slowed due to complexities/Impairments listed. [] Progression has been slowed due to co-morbidities. [x] Plan just implemented, too soon to assess goals progression <30days   [] Goals require adjustment due to lack of progress  [] Patient is not progressing as expected and requires additional follow up with physician  [] Other    Prognosis for POC: [] Good [x] Fair  [] Poor    Patient requires continued skilled intervention: [x] Yes  [] No        PLAN: Progress hip and core strength as tolerated by pt. Decrease L hip and buttocks pain and radicular sxs. Continue to monitor level of soreness with progressions today. [x] Continue per plan of care [] Alter current plan (see comments)  [] Plan of care initiated [] Hold pending MD visit [] Discharge    Electronically signed by: Roro Morales, PT    Note: If patient does not return for scheduled/recommended follow up visits, this note will serve as a discharge from care along with the most recent update on progress.

## 2020-09-24 ENCOUNTER — HOSPITAL ENCOUNTER (OUTPATIENT)
Dept: PHYSICAL THERAPY | Age: 40
Setting detail: THERAPIES SERIES
Discharge: HOME | End: 2020-09-24
Payer: COMMERCIAL

## 2020-09-24 NOTE — PROGRESS NOTES
[facility]    Physical Therapy  Cancellation/No-show Note  Patient Name:  Janiya Keith  :  1980   Date:  2020  Cancelled visits to date: 1  No-shows to date: 0    For today's appointment patient:  [x]  Cancelled  []  Rescheduled appointment  []  No-show     Reason given by patient:  []  Patient ill  []  Conflicting appointment  []  No transportation    []  Conflict with work  []  No reason given  [x]  Other:  Patient's daughter is sick   Comments:      Phone call information:   []  Phone call made today to patient at _ time at number provided:      []  Patient answered, conversation as follows:    []  Patient did not answer, message left as follows:  []  Phone call not made today  [x]  Phone call not needed - pt contacted us to cancel and provided reason for cancellation.      Electronically signed by:  Katharine Barry PT

## 2020-09-29 ENCOUNTER — HOSPITAL ENCOUNTER (OUTPATIENT)
Dept: PHYSICAL THERAPY | Age: 40
Setting detail: THERAPIES SERIES
Discharge: HOME | End: 2020-09-29
Payer: COMMERCIAL

## 2020-09-29 PROCEDURE — 97140 MANUAL THERAPY 1/> REGIONS: CPT

## 2020-09-29 PROCEDURE — 97110 THERAPEUTIC EXERCISES: CPT

## 2020-09-29 NOTE — FLOWSHEET NOTE
[employer]    Physical Therapy Treatment Note/ Progress Report:     Date:  2020    Patient Name:  David Cazarse    :  1980  MRN: [id number]  Medical/Treatment Diagnosis Information:  · Diagnosis: Left hip pain (M25.551), greater trochanteric bursitis of left hip (M70.61), left snapping hip syndrome (M24.851)  · Treatment Diagnosis: Left hip pain  Insurance/Certification information:  PT Insurance Information: [facility] - $6500 deductible, $6500 OOP max, $10 co-pay, 100% co-insurance, 20 PT visits per calendar year (2 previously used)  Physician Information:  Referring Practitioner: Dr. Kiel Weber of care signed (Y/N):     Date of Patient follow up with Physician: 9/10/20     Progress Report: []  Yes  [x]  No     Functional Scale: LEFS = 69%   Date: 20    Date Range for reporting period:  Beginnin20  Ending:      Progress report due (10 Rx/or 30 days whichever is less): 75     Recertification due (POC duration/ or 90 days whichever is less): 10/19/20     Visit # Insurance Allowable Auth Needed   5 18 - auth required after 10 visits [x]Yes    []No     Pain level:  4-7/10     SUBJECTIVE:  Pt reports that she was on her feet walking outside on grass Saturday and  [place] a farm and [place], and then was on her feet quite a bit yesterday cleaning the house and doing chores. Pt states that hip and buttocks are pretty sore today from the increased activity.      OBJECTIVE: See eval   Observation:    Test measurements:      RESTRICTIONS/PRECAUTIONS: Hx of L hip sx 19 - s/p open surgical hip dislocation with trochanteric osteotomy, debridement of femoral head and microfracture; EDS; B hip dysplasia (L>R), OA    Exercises/Interventions:     Therapeutic Ex 20' Resistance Sets/sec Reps Notes   Bike  4'  For ROM   Modified figure 4 piriformis stretch  20\" 3x    Bridges  5\" 10 Decreased ROM   Supine clamshells yellow 1 10    S/L clamshells  1 5x    SAQ  1 10    Supine marches  1 10 Alternating, B LE   Seated sciatic nerve glides  1 10 Performed in supine   Patient education/TA   Prognosis, diagnosis, pain cycle   Leg press DL 45# 1 15 w/BS                                                           Manual Intervention 15'       Knee mobs/PROM       Tib/Fem Mobs       Patella Mobs       Ankle mobs       STM/gentle stick 15'   Proximal to mid ITB, gluteus medius, gluteus uriel, proximal hamstring   Progress note NPV              NMR re-education 5'              Standing glut med activation  1 10 Alternating B LE   Tandem stance  15\" 3x B                                                 Therapeutic Exercise and NMR EXR  [x] (06766) Provided verbal/tactile cueing for activities related to strengthening, flexibility, endurance, ROM for improvements in LE, proximal hip, and core control with self care, mobility, lifting, ambulation.  [] (89010) Provided verbal/tactile cueing for activities related to improving balance, coordination, kinesthetic sense, posture, motor skill, proprioception  to assist with LE, proximal hip, and core control in self care, mobility, lifting, ambulation and eccentric single leg control.      NMR and Therapeutic Activities:    [x] (47336 or 93556) Provided verbal/tactile cueing for activities related to improving balance, coordination, kinesthetic sense, posture, motor skill, proprioception and motor activation to allow for proper function of core, proximal hip and LE with self care and ADLs  [] (65211) Gait Re-education- Provided training and instruction to the patient for proper LE, core and proximal hip recruitment and positioning and eccentric body weight control with ambulation re-education including up and down stairs     Home Exercise Program:    [x] (20834) Reviewed/Progressed HEP activities related to strengthening, flexibility, endurance, ROM of core, proximal hip and LE for functional self-care, mobility, lifting and ambulation/stair navigation   [] (66826)Reviewed/Progressed HEP activities related to improving balance, coordination, kinesthetic sense, posture, motor skill, proprioception of core, proximal hip and LE for self care, mobility, lifting, and ambulation/stair navigation      Manual Treatments:  PROM / STM / Oscillations-Mobs:  G-I, II, III, IV (PA's, Inf., Post.)  [x] (15393) Provided manual therapy to mobilize LE, proximal hip and/or LS spine soft tissue/joints for the purpose of modulating pain, promoting relaxation,  increasing ROM, reducing/eliminating soft tissue swelling/inflammation/restriction, improving soft tissue extensibility and allowing for proper ROM for normal function with self care, mobility, lifting and ambulation. Modalities: Ice to go     Charges:  Timed Code Treatment Minutes: 40   Total Treatment Minutes: 40       [] EVAL (LOW) 72142 (typically 20 minutes face-to-face)  [] EVAL (MOD) 89632 (typically 30 minutes face-to-face)  [] EVAL (HIGH) 97989 (typically 45 minutes face-to-face)  [] RE-EVAL     [x] WX(78807) x 2  [] IONTO (62824)  [] NMR (53686) x     [] VASO (06345)  [x] Manual (94833) x  1   [] Other:  [] TA (64532)x     [] Mech Traction (57496)  [] ES(attended) (12855)     [] ES (un) (93499):     GOALS:   Patient stated goal: \"To reduce pain and to walk without a limp\"  []? Progressing: []? Met: []? Not Met: []? Adjusted     Therapist goals for Patient:   Short Term Goals: To be achieved in: 2 weeks  1. Independent in HEP and progression per patient tolerance, in order to prevent re-injury. []? Progressing: []? Met: []? Not Met: []? Adjusted  2. Patient will have a decrease in pain to facilitate improvement in movement, function, and ADLs as indicated by Functional Deficits. []? Progressing: []? Met: []? Not Met: []? Adjusted     Long Term Goals: To be achieved in: 6-8 weeks  1.  Disability index score of 50% or less for the LEFS to assist with reaching prior level of function. []? Progressing: []? Met: []? Not Met: []? Adjusted  2. Patient will demonstrate increased AROM to WNL for l hip to allow for proper joint functioning as indicated by patients Functional Deficits. []? Progressing: []? Met: []? Not Met: []? Adjusted  3. Patient will demonstrate an increase in Strength to good proximal hip strength and control, within 5lb HHD in LE to allow for proper functional mobility as indicated by patients Functional Deficits. []? Progressing: []? Met: []? Not Met: []? Adjusted  4. Patient will return to sitting for 1 hour without increased symptoms or restriction. []? Progressing: []? Met: []? Not Met: []? Adjusted  5. Patient will be able to walk for 15 minutes without increased symptoms or restriction. []? Progressing: []? Met: []? Not Met: []? Adjusted     Progression Towards Functional goals:  [] Patient is progressing as expected towards functional goals listed. [] Progression is slowed due to complexities listed. [] Progression has been slowed due to co-morbidities. [x] Plan just implemented, too soon to assess goals progression  [] Other:     ASSESSMENT:  Pt responded well to stick/STM to gluteus medius, gluteus uriel and proximal hamstring today. Increased TTP and tightness noted along proximal to middle ITB today. Added S/L clamshells with no increased hip pain, but pt was very fatigued after about 5 repetitions. Also added tandem stance for improved gluteus medius strengthening with proprioception deficits noted, but no increased hip pain noted.           Return to Play: (if applicable)   []  Stage 1: Intro to Strength   []  Stage 2: Return to Run and Strength   []  Stage 3: Return to Jump and Strength   []  Stage 4: Dynamic Strength and Agility   []  Stage 5: Sport Specific Training     []  Ready to Return to Play, Meets All Above Stages   []  Not Ready for Return to Sports   Comments:            Treatment/Activity Tolerance:  [] Patient tolerated treatment well [x] Patient limited by fatique  [] Patient limited by pain  [] Patient limited by other medical complications  [] Other:     Overall Progression Towards Functional goals/ Treatment Progress Update:  [] Patient is progressing as expected towards functional goals listed. [] Progression is slowed due to complexities/Impairments listed. [] Progression has been slowed due to co-morbidities. [x] Plan just implemented, too soon to assess goals progression <30days   [] Goals require adjustment due to lack of progress  [] Patient is not progressing as expected and requires additional follow up with physician  [] Other    Prognosis for POC: [] Good [x] Fair  [] Poor    Patient requires continued skilled intervention: [x] Yes  [] No        PLAN: Progress hip and core strength as tolerated by pt. Decrease L hip and buttocks pain and radicular sxs. Continue to monitor level of soreness with progressions today. [x] Continue per plan of care [] Alter current plan (see comments)  [] Plan of care initiated [] Hold pending MD visit [] Discharge    Electronically signed by: Suzette Ho PT    Note: If patient does not return for scheduled/recommended follow up visits, this note will serve as a discharge from care along with the most recent update on progress.

## 2020-10-06 ENCOUNTER — HOSPITAL ENCOUNTER (OUTPATIENT)
Dept: PHYSICAL THERAPY | Age: 40
Setting detail: THERAPIES SERIES
Discharge: HOME | End: 2020-10-06
Payer: COMMERCIAL

## 2020-10-06 PROCEDURE — 97140 MANUAL THERAPY 1/> REGIONS: CPT

## 2020-10-06 PROCEDURE — 97110 THERAPEUTIC EXERCISES: CPT

## 2020-10-06 NOTE — FLOWSHEET NOTE
[employer] [employer] [facility]    Physical Therapy Treatment Note/ Progress Report:     Date:  10/6/2020    Patient Name:  Danisha Arreola    :  1980  MRN: [id number]  Medical/Treatment Diagnosis Information:  · Diagnosis: Left hip pain (M25.551), greater trochanteric bursitis of left hip (M70.61), left snapping hip syndrome (M24.851)  · Treatment Diagnosis: Left hip pain  Insurance/Certification information:  PT Insurance Information: [employer] - $6500 deductible, $6500 OOP max, $10 co-pay, 100% co-insurance, 20 PT visits per calendar year (2 previously used)  Physician Information:  Referring Practitioner: Dr. Shahla Finn of care signed (Y/N):     Date of Patient follow up with Physician: 9/10/20     Progress Report: []  Yes  [x]  No     Functional Scale: LEFS = 69%   Date: 20    Date Range for reporting period:  Beginnin20  Ending:      Progress report due (10 Rx/or 30 days whichever is less):      Recertification due (POC duration/ or 90 days whichever is less): 10/19/20     Visit # Insurance Allowable Auth Needed   6 18 - auth required after 10 visits [x]Yes    []No     Pain level:  4-710     SUBJECTIVE:  Pt reports that hip had been doing really well. Pt walked outside on sand this weekend and was very sore the following day, mostly in lateral hip, but then felt much better the following day. Pt has follow up with Dr. Malinda Sykes 10/16.     OBJECTIVE: See eval   Observation:    Test measurements:      RESTRICTIONS/PRECAUTIONS: Hx of L hip sx 19 - s/p open surgical hip dislocation with trochanteric osteotomy, debridement of femoral head and microfracture; EDS; B hip dysplasia (L>R), OA    Exercises/Interventions:     Therapeutic Ex 20' Resistance Sets/sec Reps Notes   Bike  4'  For ROM   Modified figure 4 piriformis stretch  20\" 3x    Bridges  5\" 10 Decreased ROM   Supine clamshells yellow 1 10    S/L clamshells  1 8x    SAQ  1 10    Supine marches  1 10 Alternating, B LE   Seated sciatic nerve glides  1 10 Performed in supine   Patient education/TA   Prognosis, diagnosis, pain cycle   Leg press DL 45# 1 15 w/BS   Standing HS curls  1 10 B   Lateral stepping at wall  1 3 laps                                              Manual Intervention 15'       Knee mobs/PROM       Tib/Fem Mobs       Patella Mobs       Ankle mobs       STM/gentle stick 15'   Proximal to mid ITB, gluteus medius, gluteus uriel, proximal hamstring   Progress note NPV              NMR re-education 5'              Standing glut med activation  1 10 Alternating B LE   Tandem stance  15\" 3x B                                                 Therapeutic Exercise and NMR EXR  [x] (48406) Provided verbal/tactile cueing for activities related to strengthening, flexibility, endurance, ROM for improvements in LE, proximal hip, and core control with self care, mobility, lifting, ambulation.  [] (58825) Provided verbal/tactile cueing for activities related to improving balance, coordination, kinesthetic sense, posture, motor skill, proprioception  to assist with LE, proximal hip, and core control in self care, mobility, lifting, ambulation and eccentric single leg control.      NMR and Therapeutic Activities:    [x] (81535 or 74882) Provided verbal/tactile cueing for activities related to improving balance, coordination, kinesthetic sense, posture, motor skill, proprioception and motor activation to allow for proper function of core, proximal hip and LE with self care and ADLs  [] (71727) Gait Re-education- Provided training and instruction to the patient for proper LE, core and proximal hip recruitment and positioning and eccentric body weight control with ambulation re-education including up and down stairs     Home Exercise Program:    [x] (09694) Reviewed/Progressed HEP activities related to strengthening, flexibility, endurance, ROM of core, proximal hip and LE for functional self-care, mobility, lifting and ambulation/stair navigation   [] (03157)Reviewed/Progressed HEP activities related to improving balance, coordination, kinesthetic sense, posture, motor skill, proprioception of core, proximal hip and LE for self care, mobility, lifting, and ambulation/stair navigation      Manual Treatments:  PROM / STM / Oscillations-Mobs:  G-I, II, III, IV (PA's, Inf., Post.)  [x] (19044) Provided manual therapy to mobilize LE, proximal hip and/or LS spine soft tissue/joints for the purpose of modulating pain, promoting relaxation,  increasing ROM, reducing/eliminating soft tissue swelling/inflammation/restriction, improving soft tissue extensibility and allowing for proper ROM for normal function with self care, mobility, lifting and ambulation. Modalities: Ice to go     Charges:  Timed Code Treatment Minutes: 40   Total Treatment Minutes: 40       [] EVAL (LOW) 98611 (typically 20 minutes face-to-face)  [] EVAL (MOD) 67368 (typically 30 minutes face-to-face)  [] EVAL (HIGH) 59787 (typically 45 minutes face-to-face)  [] RE-EVAL     [x] DK(19934) x 2  [] IONTO (47249)  [] NMR (11479) x     [] VASO (03490)  [x] Manual (76619) x  1   [] Other:  [] TA (47563)x     [] Mech Traction (24175)  [] ES(attended) (55659)     [] ES (un) (48951):     GOALS:   Patient stated goal: \"To reduce pain and to walk without a limp\"  []? Progressing: []? Met: []? Not Met: []? Adjusted     Therapist goals for Patient:   Short Term Goals: To be achieved in: 2 weeks  1. Independent in HEP and progression per patient tolerance, in order to prevent re-injury. []? Progressing: []? Met: []? Not Met: []? Adjusted  2. Patient will have a decrease in pain to facilitate improvement in movement, function, and ADLs as indicated by Functional Deficits. []? Progressing: []? Met: []? Not Met: []? Adjusted     Long Term Goals: To be achieved in: 6-8 weeks  1.  Disability index score of 50% or less for the LEFS to assist with reaching prior level of function. []? Progressing: []? Met: []? Not Met: []? Adjusted  2. Patient will demonstrate increased AROM to WNL for l hip to allow for proper joint functioning as indicated by patients Functional Deficits. []? Progressing: []? Met: []? Not Met: []? Adjusted  3. Patient will demonstrate an increase in Strength to good proximal hip strength and control, within 5lb HHD in LE to allow for proper functional mobility as indicated by patients Functional Deficits. []? Progressing: []? Met: []? Not Met: []? Adjusted  4. Patient will return to sitting for 1 hour without increased symptoms or restriction. []? Progressing: []? Met: []? Not Met: []? Adjusted  5. Patient will be able to walk for 15 minutes without increased symptoms or restriction. []? Progressing: []? Met: []? Not Met: []? Adjusted     Progression Towards Functional goals:  [] Patient is progressing as expected towards functional goals listed. [] Progression is slowed due to complexities listed. [] Progression has been slowed due to co-morbidities. [x] Plan just implemented, too soon to assess goals progression  [] Other:     ASSESSMENT:  Continued TTP noted along proximal to middle ITB, but improved tolerance to stick/STM to gluteus medius, gluteus uriel and piriformis today. Added lateral stepping and standing HS curls for improved LE strengthening. Pt demonstrates improved ability to perform S/L clamshells today, but continues to be very fatigued with this exercise. Pt demonstrates improved gait mechanics with improved step length noted bilaterally, but does still demonstrate mild Trendelenburg on L.        Return to Play: (if applicable)   []  Stage 1: Intro to Strength   []  Stage 2: Return to Run and Strength   []  Stage 3: Return to Jump and Strength   []  Stage 4: Dynamic Strength and Agility   []  Stage 5: Sport Specific Training     []  Ready to Return to Play, Meets All Above Stages   []  Not Ready for Return to Sports   Comments:            Treatment/Activity Tolerance:  [] Patient tolerated treatment well [x] Patient limited by fatique  [] Patient limited by pain  [] Patient limited by other medical complications  [] Other:     Overall Progression Towards Functional goals/ Treatment Progress Update:  [] Patient is progressing as expected towards functional goals listed. [x] Progression is slowed due to complexities/Impairments listed. [] Progression has been slowed due to co-morbidities. [] Plan just implemented, too soon to assess goals progression <30days   [] Goals require adjustment due to lack of progress  [] Patient is not progressing as expected and requires additional follow up with physician  [] Other    Prognosis for POC: [] Good [x] Fair  [] Poor    Patient requires continued skilled intervention: [x] Yes  [] No        PLAN: Progress hip and core strength as tolerated by pt. Decrease L hip and buttocks pain and radicular sxs. Continue to monitor level of soreness with progressions today. Pt has follow up with Dr. Willy Gonzalez 10/16. [x] Continue per plan of care [] Alter current plan (see comments)  [] Plan of care initiated [] Hold pending MD visit [] Discharge    Electronically signed by: Gene Foley PT    Note: If patient does not return for scheduled/recommended follow up visits, this note will serve as a discharge from care along with the most recent update on progress.

## 2020-10-12 ENCOUNTER — APPOINTMENT (OUTPATIENT)
Dept: PHYSICAL THERAPY | Age: 40
End: 2020-10-12
Payer: COMMERCIAL

## 2020-10-13 ENCOUNTER — HOSPITAL ENCOUNTER (OUTPATIENT)
Dept: PHYSICAL THERAPY | Age: 40
Setting detail: THERAPIES SERIES
Discharge: HOME | End: 2020-10-13
Payer: COMMERCIAL

## 2020-10-16 ENCOUNTER — OFFICE VISIT (OUTPATIENT)
Dept: ORTHOPEDIC SURGERY | Age: 40
End: 2020-10-16
Payer: COMMERCIAL

## 2020-10-16 PROCEDURE — 99213 OFFICE O/P EST LOW 20 MIN: CPT | Performed by: PHYSICIAN ASSISTANT

## 2020-10-16 NOTE — PROGRESS NOTES
Chief Complaint    Follow-up (LEFT hip pain manageable; sent surgery pics from last surgery to [facility] via email; discuss treatment and prolonging THR)      History of Present Illness:  Bethany Saint is a 47 y.o. female who presents today for a follow-up visit concerning her left hip pain. Her MRI was discussed with the patient and her last visit and she is here today reporting she is improving but wants to make sure she is doing the right things to preserve her left hip. Patient has been involved in outpatient physical therapy which has helped but she continues to be very sensitive to light touch over the lateral aspect of her left hip and along the incision. She continues to have weakness but she states that this is improving. She has not taking the gabapentin because she was worried about drug reactions. She continues to ambulate with a limp without an assistive device. Medical History:  Patient's medications, allergies, past medical, surgical, social and family histories were reviewed and updated as appropriate. Review of Systems:  Relevant review of systems reviewed and available in the patient's chart    Vital Signs: There were no vitals filed for this visit. General Exam:   Constitutional: Patient is adequately groomed with no evidence of malnutrition  DTRs: Deep tendon reflexes are intact  Mental Status: The patient is oriented to time, place and person. The patient's mood and affect are appropriate. Lymphatic: The lymphatic examination bilaterally reveals all areas to be without enlargement or induration. Vascular: Examination reveals no swelling or calf tenderness. Peripheral pulses are palpable and 2+. Neurological: The patient has good coordination. There is no weakness or sensory deficit. There is no height or weight on file to calculate BMI. Left hip Examination:    Inspection:  No erythema or signs of infection. There are no cutaneous lesions.   There is a well-healed

## 2020-10-20 ENCOUNTER — APPOINTMENT (OUTPATIENT)
Dept: PHYSICAL THERAPY | Age: 40
End: 2020-10-20
Payer: COMMERCIAL

## 2020-10-26 ENCOUNTER — HOSPITAL ENCOUNTER (OUTPATIENT)
Dept: PHYSICAL THERAPY | Age: 40
Setting detail: THERAPIES SERIES
Discharge: HOME | End: 2020-10-26
Payer: COMMERCIAL

## 2020-10-26 NOTE — PROGRESS NOTES
[employer]    Physical Therapy  Cancellation/No-show Note  Patient Name:  Navid Tyson  :  1980   Date:  10/26/2020  Cancelled visits to date: 3  No-shows to date: 0    For today's appointment patient:  [x]  Cancelled  []  Rescheduled appointment  []  No-show     Reason given by patient:  []  Patient ill  []  Conflicting appointment  []  No transportation    []  Conflict with work  []  No reason given  [x]  Other:  Patient is having chest pain due to flare up of esophagitis   Comments:      Phone call information:   []  Phone call made today to patient at _ time at number provided:      []  Patient answered, conversation as follows:    []  Patient did not answer, message left as follows:  []  Phone call not made today  [x]  Phone call not needed - pt contacted us to cancel and provided reason for cancellation.      Electronically signed by:  Etienne Adams PT

## 2020-11-10 ENCOUNTER — TELEPHONE (OUTPATIENT)
Dept: ORTHOPEDIC SURGERY | Age: 40
End: 2020-11-10

## 2020-11-10 ENCOUNTER — HOSPITAL ENCOUNTER (OUTPATIENT)
Dept: PHYSICAL THERAPY | Age: 40
Setting detail: THERAPIES SERIES
Discharge: HOME | End: 2020-11-10
Payer: COMMERCIAL

## 2020-11-10 NOTE — PROGRESS NOTES
[facility]    Physical Therapy  Cancellation/No-show Note  Patient Name:  Alessandro Morales  :  1980   Date:  11/10/2020  Cancelled visits to date: 4  No-shows to date: 0    For today's appointment patient:  [x]  Cancelled  []  Rescheduled appointment  []  No-show     Reason given by patient:  []  Patient ill  []  Conflicting appointment  []  No transportation    []  Conflict with work  []  No reason given  [x]  Other:  Patient's daughter is home sick from school   Comments:      Phone call information:   []  Phone call made today to patient at _ time at number provided:      []  Patient answered, conversation as follows:    []  Patient did not answer, message left as follows:  []  Phone call not made today  [x]  Phone call not needed - pt contacted us to cancel and provided reason for cancellation.      Electronically signed by:  Merary Mckinney, PT

## 2025-01-31 ENCOUNTER — HOSPITAL ENCOUNTER (OUTPATIENT)
Age: 45
Discharge: HOME | End: 2025-01-31
Payer: COMMERCIAL

## 2025-01-31 DIAGNOSIS — J18.9 UNRESOLVED PNEUMONIA: ICD-10-CM

## 2025-01-31 PROCEDURE — 71046 X-RAY EXAM CHEST 2 VIEWS: CPT
